# Patient Record
Sex: MALE | Race: WHITE | NOT HISPANIC OR LATINO | Employment: OTHER | URBAN - METROPOLITAN AREA
[De-identification: names, ages, dates, MRNs, and addresses within clinical notes are randomized per-mention and may not be internally consistent; named-entity substitution may affect disease eponyms.]

---

## 2017-02-22 ENCOUNTER — ALLSCRIPTS OFFICE VISIT (OUTPATIENT)
Dept: OTHER | Facility: OTHER | Age: 82
End: 2017-02-22

## 2017-07-07 ENCOUNTER — GENERIC CONVERSION - ENCOUNTER (OUTPATIENT)
Dept: OTHER | Facility: OTHER | Age: 82
End: 2017-07-07

## 2017-08-03 ENCOUNTER — ALLSCRIPTS OFFICE VISIT (OUTPATIENT)
Dept: OTHER | Facility: OTHER | Age: 82
End: 2017-08-03

## 2017-08-03 DIAGNOSIS — R07.9 CHEST PAIN: ICD-10-CM

## 2017-08-18 ENCOUNTER — HOSPITAL ENCOUNTER (OUTPATIENT)
Dept: NON INVASIVE DIAGNOSTICS | Facility: CLINIC | Age: 82
Discharge: HOME | End: 2017-08-18
Payer: MEDICARE

## 2017-08-18 DIAGNOSIS — R07.9 CHEST PAIN: ICD-10-CM

## 2017-08-18 PROCEDURE — A9502 TC99M TETROFOSMIN: HCPCS

## 2017-08-18 PROCEDURE — 93017 CV STRESS TEST TRACING ONLY: CPT

## 2017-08-18 PROCEDURE — 78452 HT MUSCLE IMAGE SPECT MULT: CPT

## 2017-08-18 PROCEDURE — 93306 TTE W/DOPPLER COMPLETE: CPT

## 2017-08-18 RX ADMIN — REGADENOSON 0.4 MG: 0.08 INJECTION, SOLUTION INTRAVENOUS at 14:15

## 2017-08-19 LAB
CHEST PAIN STATEMENT: NORMAL
MAX DIASTOLIC BP: 82 MMHG
MAX HEART RATE: 94 BPM
MAX PREDICTED HEART RATE: 136 BPM
MAX. SYSTOLIC BP: 182 MMHG
PROTOCOL NAME: NORMAL
REASON FOR TERMINATION: NORMAL
TARGET HR FORMULA: NORMAL
TEST INDICATION: NORMAL
TIME IN EXERCISE PHASE: 180 S

## 2017-09-01 ENCOUNTER — ALLSCRIPTS OFFICE VISIT (OUTPATIENT)
Dept: OTHER | Facility: OTHER | Age: 82
End: 2017-09-01

## 2017-09-01 ENCOUNTER — GENERIC CONVERSION - ENCOUNTER (OUTPATIENT)
Dept: OTHER | Facility: OTHER | Age: 82
End: 2017-09-01

## 2017-10-20 ENCOUNTER — GENERIC CONVERSION - ENCOUNTER (OUTPATIENT)
Dept: OTHER | Facility: OTHER | Age: 82
End: 2017-10-20

## 2017-11-09 ENCOUNTER — ALLSCRIPTS OFFICE VISIT (OUTPATIENT)
Dept: OTHER | Facility: OTHER | Age: 82
End: 2017-11-09

## 2017-11-11 NOTE — PROGRESS NOTES
Assessment  Assessed    1  Arteriosclerotic coronary artery disease (414 00) (I25 10)   2  Benign essential hypertension (401 1) (I10)   3  Acquired stenosis of aortic valve (424 1) (I35 0)   4  S/P aortic valve replacement with bioprosthetic valve (V42 2) (Z95 3)   5  Abnormal nuclear stress test (794 39) (R94 39)    Plan  Benign essential hypertension    · AmLODIPine Besylate 5 MG Oral Tablet (Norvasc); Take 1 tablet daily   Rx By: Lee Lambert; Dispense: 90 Days ; #:90 Tablet; Refill: 3;Benign essential hypertension; SAMEERA = N; Verified Transmission to [address]; Last Updated By: [employer]; 11/9/2017 4:52:06 PM   · Follow-up visit in 3 months Evaluation and Treatment  Follow-up  Status: Complete Done: [id number]   Ordered;Benign essential hypertension; Ordered By: Lee Lambert Performed:  Due: 43GQC1728; Last Updated By: eKisha Thurston; 11/9/2017 4:55:57 PM    Discussion/Summary  Cardiology Discussion Summary Free Text Note Form [facility]: We again discussed his CAD  He is not having any CP symptoms at this time  We will continue will medical management unless his symptoms change  I will add Norvasc 5 mg daily for better BP control  The rest of his medications remain the same  RTO 3 months  Chief Complaint  Chief Complaint Free Text Note Form: Hospital f/u after going to [facility]  He denies chest pain but said he gets SOB with exertion  [facility] prescribed medications that he wants to discuss with Dr Piyush Madrigal before taking  History of Present Illness  Cardiology HPI Free Text Note Form [facility]: He was recently in [facility] with a fever / viral URI   ECG showed changes  Troponin was WNL  Cardiac cath was recommended based on his ECG  He has known CAD with remote CABG  Recent nuclear stress test did show inferior ischemia  We discussed cardiac cath at his last OV and decided to follow him clinically on medical management  CP symptoms have resolved   He is not very active  He has not needed to use the Nitropatches  His weight is down from 226 to 217 LBS  BP has been up  Review of Systems  Cardiology Male ROS:    Cardiac: as noted in HPI  Skin: No complaints of nonhealing sores or skin rash  Genitourinary: No complaints of recurrent urinary tract infections, frequent urination at night, difficult urination, blood in urine, kidney stones, loss of bladder control, no kidney or prostate problems, no erectile dysfunction  Psychological: No complaints of feeling depressed, anxiety, panic attacks, or difficulty concentrating  General: No complaints of trouble sleeping, lack of energy, fatigue, appetite changes, weight changes, fever, frequent infections, or night sweats  Respiratory: No complaints of shortness of breath, cough with sputum, or wheezing  HEENT: No complaints of serious problems, hearing problems, nose problems, throat problems, or snoring  Gastrointestinal: No complaints of liver problems, nausea, vomiting, heartburn, constipation, bloody stools, diarrhea, problems swallowing, adbominal pain, or rectal bleeding  Hematologic: No complaints of bleeding disorders, anemia, blood clots, or excessive brusing  Neurological: No complaints of numbness, tingling, dizziness, weakness, seizures, headaches, syncope or fainting, AM fatigue, daytime sleepiness, no witnessed apnea episodes  Musculoskeletal: No complaints of arthritis, back pain, or painfull swelling  ROS Reviewed:   ROS reviewed  Active Problems  Problems    1  Acquired stenosis of aortic valve (424 1) (I35 0)   2  Arteriosclerotic coronary artery disease (414 00) (I25 10)   3  Atrial fibrillation (427 31) (I48 91)   4  Benign essential hypertension (401 1) (I10)   5  Chest pain, exertional (786 50) (R07 9)   6  Chronic venous insufficiency (459 81) (I87 2)   7  Ecchymosis (459 89) (R58)   8  Elevated TSH (794 5) (R94 6)   9   Encounter for monitoring amiodarone therapy (V58 83,V58 69) (Z51 81,Z79 899)   10  Exertional angina (413 9) (I20 8)   11  Fatigue (780 79) (R53 83)   12  Hypercholesterolemia (272 0) (E78 00)   13  Prediabetes (790 29) (R73 09)   14  Shortness of breath (786 05) (R06 02)   15  Sleep apnea (780 57) (G47 30)   16  Stroke Syndrome (436)   17  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)   18  Vitamin D deficiency (268 9) (E55 9)    Past Medical History  Problems    1  History of Congestive heart failure (428 0) (I50 9)  Active Problems And Past Medical History Reviewed: The active problems and past medical history were reviewed and updated today  Surgical History  Problems    1  History of Aortic Valve Replacement   2  History of CABG   3  History of Cath Placement Of Stent 1   4  History of Endarterectomy   5  History of Tonsillectomy  Surgical History Reviewed: The surgical history was reviewed and updated today  Family History  Father    1  Family history of    2  Family history of myocardial infarction (V17 3) (Z82 49)  Family History    3  Family history of Asthma with acute exacerbation, unspecified asthma severity   4  Family history of arthritis (V17 7) (Z82 61)  Family History Reviewed: The family history was reviewed and updated today  Social History  Problems    · Denied: History of Being A Social Drinker   · Denied: History of Caffeine use   · Former smoker (V15 82) (Q27 487)   · No drug use  Social History Reviewed: The social history was reviewed and updated today  The social history was reviewed and is unchanged  Current Meds   1  Amiodarone HCl - 200 MG Oral Tablet; TAKE 1 TABLET DAILY; Therapy: 20CSS6746 to (Evaluate:2017)  Requested for: 71SEB4549; Last Rx:00Zna7394 Ordered   2  Aspirin 81 MG TABS; TAKE 1 TABLET DAILY; Therapy: (Recorded:98Hxf3532) to Recorded   3  Crestor 10 MG Oral Tablet; TAKE 1 TABLET DAILY; Therapy: 30Jnd9495 to (Evaluate:72Uby0842) Recorded   4  Fish Oil CAPS; Take 2 tabs po qd;  Therapy: (Recorded:06Kwd6218) to Recorded   5  Furosemide 40 MG Oral Tablet; Take 1 tablet daily; Therapy: 08Apr2016 to  Requested for: 22Feb2017 Recorded   6  Metoprolol Succinate ER 25 MG Oral Tablet Extended Release 24 Hour; TAKE 1 TABLET ONCE DAILY; Therapy: 49CBW5888 to (Baljit Moore)  Requested for: 63ICW3413; Last Rx:06Mar2017 Ordered   7  Nitroglycerin 0 6 MG/HR Transdermal Patch 24 Hour; APPLY 1 PATCH Daily; Therapy: 01Sep2017 to (Evaluate:17Sny7600)  Requested for: 01Sep2017; Last Rx:01Sep2017 Ordered   8  Pantoprazole Sodium 40 MG Oral Tablet Delayed Release; take 1 tablet every day; Therapy: 07RIZ2630 to (Evaluate:19Mar2017)  Requested for: 26KKR6499; Last Rx:24Mar2016 Ordered   9  ROPINIRole HCl - 0 25 MG Oral Tablet; Take 1 tablet daily; Therapy: (Recorded:82Hse0317) to Recorded   10  Vitamin D3 5000 UNIT Oral Capsule; take 1 capsule daily; Therapy: (Recorded:16Jun2016) to Recorded  Medication List Reviewed: The medication list was reviewed and updated today  Allergies  Medication    1  Penicillins    Vitals  Vital Signs    Recorded: 20OAD4817 04:39PM   Heart Rate 72, Apical   Systolic 293, RUE   Diastolic 70, RUE   Height 5 ft 10 in   Weight 217 lb    BMI Calculated 31 14   BSA Calculated 2 16       Physical Exam   Constitutional  General appearance: No acute distress, well appearing and well nourished  Eyes  Conjunctiva and Sclera examination: Conjunctiva pink, sclera anicteric  Ears, Nose, Mouth, and Throat - Oropharynx: Clear, nares are clear, mucous membranes are moist   Neck  Neck and thyroid: Normal, supple, trachea midline, no thyromegaly  Pulmonary  Respiratory effort: No increased work of breathing or signs of respiratory distress  Auscultation of lungs: Clear to auscultation, no rales, no rhonchi, no wheezing, good air movement  Cardiovascular  Auscultation of heart: Normal rate and rhythm, normal S1 and S2, no murmurs  Carotid pulses: Normal, 2+ bilaterally     Peripheral vascular exam: Normal pulses throughout, no tenderness, erythema or swelling  Pedal pulses: Normal, 2+ bilaterally  Examination of extremities for edema and/or varicosities: Normal    Abdomen  Abdomen: Non-tender and no distention  Liver and spleen: No hepatomegaly or splenomegaly  Musculoskeletal Gait and station: Normal gait  -- Digits and nails: Normal without clubbing or cyanosis  -- Inspection/palpation of joints, bones, and muscles: Normal, ROM normal    Skin - Skin and subcutaneous tissue: Normal without rashes or lesions  Skin is warm and well perfused, normal turgor  Neurologic - Cranial nerves: II - XII intact  -- Speech: Normal    Psychiatric - Orientation to person, place, and time: Normal -- Mood and affect: Normal       Signatures   Electronically signed by : ARTURO Davidson ; Nov 10 2017  7:49AM EST                       (Author)

## 2018-01-13 VITALS
HEIGHT: 70 IN | SYSTOLIC BLOOD PRESSURE: 146 MMHG | DIASTOLIC BLOOD PRESSURE: 58 MMHG | WEIGHT: 225.44 LBS | BODY MASS INDEX: 32.27 KG/M2 | HEART RATE: 54 BPM

## 2018-01-13 VITALS
BODY MASS INDEX: 32.21 KG/M2 | HEIGHT: 70 IN | WEIGHT: 225 LBS | DIASTOLIC BLOOD PRESSURE: 66 MMHG | HEART RATE: 66 BPM | SYSTOLIC BLOOD PRESSURE: 110 MMHG

## 2018-01-15 VITALS
BODY MASS INDEX: 31.07 KG/M2 | HEIGHT: 70 IN | WEIGHT: 217 LBS | SYSTOLIC BLOOD PRESSURE: 160 MMHG | HEART RATE: 72 BPM | DIASTOLIC BLOOD PRESSURE: 70 MMHG

## 2018-01-22 VITALS
WEIGHT: 226.06 LBS | HEIGHT: 70 IN | BODY MASS INDEX: 32.36 KG/M2 | HEART RATE: 61 BPM | DIASTOLIC BLOOD PRESSURE: 68 MMHG | SYSTOLIC BLOOD PRESSURE: 148 MMHG

## 2018-02-14 RX ORDER — FUROSEMIDE 40 MG/1
1 TABLET ORAL DAILY
COMMUNITY
Start: 2016-04-08 | End: 2018-08-13 | Stop reason: SDUPTHER

## 2018-02-14 RX ORDER — ROPINIROLE 0.25 MG/1
1 TABLET, FILM COATED ORAL DAILY
COMMUNITY
End: 2018-02-15 | Stop reason: SDUPTHER

## 2018-02-14 RX ORDER — AMLODIPINE BESYLATE 5 MG/1
1 TABLET ORAL DAILY
COMMUNITY
Start: 2017-11-09 | End: 2018-11-17 | Stop reason: SDUPTHER

## 2018-02-14 RX ORDER — ROSUVASTATIN CALCIUM 10 MG/1
1 TABLET, COATED ORAL DAILY
COMMUNITY
Start: 2017-08-03 | End: 2018-02-15 | Stop reason: SDUPTHER

## 2018-02-14 RX ORDER — MAG HYDROX/ALUMINUM HYD/SIMETH 400-400-40
1 SUSPENSION, ORAL (FINAL DOSE FORM) ORAL DAILY
COMMUNITY

## 2018-02-14 RX ORDER — NITROGLYCERIN 120 MG/1
1 PATCH TRANSDERMAL DAILY
COMMUNITY
Start: 2017-09-01 | End: 2019-01-01 | Stop reason: SDUPTHER

## 2018-02-14 RX ORDER — METOPROLOL SUCCINATE 25 MG/1
1 TABLET, EXTENDED RELEASE ORAL DAILY
COMMUNITY
Start: 2014-10-29 | End: 2018-02-15 | Stop reason: SDUPTHER

## 2018-02-14 RX ORDER — AMIODARONE HYDROCHLORIDE 200 MG/1
1 TABLET ORAL DAILY
COMMUNITY
Start: 2015-06-19 | End: 2019-01-01 | Stop reason: SDUPTHER

## 2018-02-14 RX ORDER — PANTOPRAZOLE SODIUM 40 MG/1
1 TABLET, DELAYED RELEASE ORAL DAILY
COMMUNITY
Start: 2011-09-24 | End: 2018-02-15 | Stop reason: ALTCHOICE

## 2018-02-15 ENCOUNTER — OFFICE VISIT (OUTPATIENT)
Dept: CARDIOLOGY CLINIC | Facility: CLINIC | Age: 83
End: 2018-02-15
Payer: MEDICARE

## 2018-02-15 VITALS
HEIGHT: 70 IN | BODY MASS INDEX: 31.5 KG/M2 | HEART RATE: 60 BPM | SYSTOLIC BLOOD PRESSURE: 156 MMHG | DIASTOLIC BLOOD PRESSURE: 62 MMHG | WEIGHT: 220 LBS

## 2018-02-15 DIAGNOSIS — I25.10 CAD, MULTIPLE VESSEL: ICD-10-CM

## 2018-02-15 DIAGNOSIS — I35.0 NONRHEUMATIC AORTIC VALVE STENOSIS: Primary | ICD-10-CM

## 2018-02-15 DIAGNOSIS — I10 ESSENTIAL HYPERTENSION: ICD-10-CM

## 2018-02-15 DIAGNOSIS — R94.39 ABNORMAL NUCLEAR STRESS TEST: ICD-10-CM

## 2018-02-15 DIAGNOSIS — Z95.2 S/P AVR: ICD-10-CM

## 2018-02-15 DIAGNOSIS — Z95.1 HX OF CABG: ICD-10-CM

## 2018-02-15 PROCEDURE — 99214 OFFICE O/P EST MOD 30 MIN: CPT | Performed by: INTERNAL MEDICINE

## 2018-02-15 RX ORDER — BRIMONIDINE TARTRATE 0.15 %
1 DROPS OPHTHALMIC (EYE) 2 TIMES DAILY
COMMUNITY
Start: 2015-03-20

## 2018-02-15 RX ORDER — FUROSEMIDE 20 MG/1
TABLET ORAL
COMMUNITY
Start: 2017-11-17 | End: 2018-02-15 | Stop reason: SDUPTHER

## 2018-02-15 RX ORDER — GABAPENTIN 300 MG/1
300 CAPSULE ORAL
COMMUNITY
Start: 2016-07-19

## 2018-02-15 RX ORDER — ROPINIROLE 0.25 MG/1
0.25 TABLET, FILM COATED ORAL
COMMUNITY
Start: 2017-02-21

## 2018-02-15 RX ORDER — AMLODIPINE BESYLATE 5 MG/1
TABLET ORAL
COMMUNITY
Start: 2017-11-09 | End: 2018-09-19 | Stop reason: SDUPTHER

## 2018-02-15 RX ORDER — METOPROLOL SUCCINATE 25 MG/1
25 TABLET, EXTENDED RELEASE ORAL
COMMUNITY
Start: 2017-06-26 | End: 2018-03-29 | Stop reason: SDUPTHER

## 2018-02-15 RX ORDER — AMIODARONE HYDROCHLORIDE 200 MG/1
200 TABLET ORAL
COMMUNITY
Start: 2012-10-10 | End: 2018-09-19 | Stop reason: SDUPTHER

## 2018-02-15 RX ORDER — OMEPRAZOLE 20 MG/1
CAPSULE, DELAYED RELEASE ORAL
COMMUNITY
Start: 2018-02-08 | End: 2019-01-01

## 2018-02-15 RX ORDER — IBUPROFEN 200 MG
1 CAPSULE ORAL
COMMUNITY

## 2018-02-15 RX ORDER — ROSUVASTATIN CALCIUM 10 MG/1
10 TABLET, COATED ORAL
COMMUNITY
Start: 2017-11-03

## 2018-02-16 NOTE — PROGRESS NOTES
Cardiology Follow Up    Aida Haynes  1933  [id number]  [address] [facility]  [address] [place] [address] [address]    1  Nonrheumatic aortic valve stenosis     2  CAD, multiple vessel     3  Essential hypertension     4  Abnormal nuclear stress test     5  Hx of CABG     6  S/P AVR           Discussion/Summary: All of his assessed cardiac problems appear to be stable  I have reviewed his medications and made no changes  No cardiac testing is ordered  RTO 6 months  Interval History:  He is not very active but has had only occasional CP since his last OV  He denies significant SOB  He denies palpitations, dizziness  He has CAD with prior CABG and AS with prior bioprosthetic AVR  Last stress test did show ischemia but he has remain stable with medical therapy  Patient Active Problem List   Diagnosis    Nonrheumatic aortic valve stenosis    CAD, multiple vessel    Essential hypertension    Abnormal nuclear stress test    Hx of CABG    S/P AVR     History reviewed  No pertinent past medical history  Social History     Social History    Marital status:      Spouse name: N/A    Number of children: N/A    Years of education: N/A     Occupational History    Not on file  Social History Main Topics    Smoking status: Former Smoker    Smokeless tobacco: Never Used    Alcohol use No    Drug use: No    Sexual activity: Not on file     Other Topics Concern    Not on file     Social History Narrative    No narrative on file      History reviewed  No pertinent family history  History reviewed  No pertinent surgical history      Current Outpatient Prescriptions:     amiodarone 200 mg tablet, Take 1 tablet by mouth daily, Disp: , Rfl:     amiodarone 200 mg tablet, Take 200 mg by mouth, Disp: , Rfl:     amLODIPine (NORVASC) 5 mg tablet, Take 1 tablet by mouth daily, Disp: , Rfl:     amLODIPine (NORVASC) 5 mg tablet, , Disp: , Rfl:     aspirin 81 MG tablet, Take 1 tablet by mouth daily, Disp: , Rfl:     aspirin 81 MG tablet, Take 81 mg by mouth, Disp: , Rfl:     brimonidine (ALPHAGAN P) 0 15 % ophthalmic solution, Apply 1 drop to eye, Disp: , Rfl:     calcium carbonate (OS-JESUS) 1250 (500 Ca) MG tablet, Take 1 tablet by mouth, Disp: , Rfl:     Cholecalciferol (VITAMIN D3) 5000 units CAPS, Take 1 capsule by mouth daily, Disp: , Rfl:     ERGOCALCIFEROL PO, Take 50,000 Units by mouth, Disp: , Rfl:     furosemide (LASIX) 40 mg tablet, Take 1 tablet by mouth daily, Disp: , Rfl:     gabapentin (NEURONTIN) 300 mg capsule, Take 300 mg by mouth, Disp: , Rfl:     metoprolol succinate (TOPROL-XL) 25 mg 24 hr tablet, Take 25 mg by mouth, Disp: , Rfl:     nitroglycerin (NITRODUR) 0 6 mg/hr, Place 1 patch on the skin daily, Disp: , Rfl:     Omega-3 Fatty Acids (FISH OIL) 645 MG CAPS, Take 2 tablets by mouth daily, Disp: , Rfl:     omeprazole (PriLOSEC) 20 mg delayed release capsule, , Disp: , Rfl:     rOPINIRole (REQUIP) 0 25 mg tablet, Take 0 25 mg by mouth 2 (two) times a day, Disp: , Rfl:     rosuvastatin (CRESTOR) 10 MG tablet, Take 10 mg by mouth, Disp: , Rfl:   Allergies not on file    Labs:  No visits with results within 2 Month(s) from this visit  Latest known visit with results is:   Hospital Outpatient Visit on 08/18/2017   Component Date Value    Protocol Name 08/18/2017 [facility]            Time In Exercise Phase 08/18/2017 180     MAX  SYSTOLIC BP 81/45/3087 394     Max Diastolic Bp 00/27/1095 82     Max Heart Rate 08/18/2017 94     Max Predicted Heart Rate 08/18/2017 136     Reason for Termination 08/18/2017 Test Complete     Test Indication 08/18/2017 Screening for CAD     Target Hr Formular 08/18/2017 (220 - Age)*100%     Chest Pain Statement 08/18/2017 none      Imaging: No results found      Review of Systems:  Review of Systems   Constitutional: Negative for diaphoresis, fatigue, fever and unexpected weight change  HENT: Negative  Respiratory: Negative for cough, shortness of breath and wheezing  Cardiovascular: Positive for chest pain  Negative for palpitations and leg swelling  Gastrointestinal: Negative for abdominal pain, diarrhea and nausea  Musculoskeletal: Negative for gait problem and myalgias  Skin: Negative for rash  Neurological: Negative for dizziness and numbness  Psychiatric/Behavioral: Negative  Physical Exam:  Physical Exam   Constitutional: He is oriented to person, place, and time  He appears well-developed and well-nourished  HENT:   Head: Normocephalic and atraumatic  Eyes: Pupils are equal, round, and reactive to light  Neck: Normal range of motion  Neck supple  No JVD present  Cardiovascular: Regular rhythm, S1 normal, S2 normal and normal pulses  Murmur heard  Systolic murmur is present with a grade of 2/6   Pulses:       Carotid pulses are 2+ on the right side, and 2+ on the left side  Pulmonary/Chest: Effort normal and breath sounds normal  He has no wheezes  He has no rales  Abdominal: Soft  Bowel sounds are normal  There is no tenderness  Musculoskeletal: Normal range of motion  He exhibits no edema or tenderness  Neurological: He is alert and oriented to person, place, and time  He has normal reflexes  No cranial nerve deficit  Skin: Skin is warm  Psychiatric: He has a normal mood and affect

## 2018-03-29 DIAGNOSIS — I25.10 CORONARY ARTERY DISEASE INVOLVING NATIVE CORONARY ARTERY OF NATIVE HEART WITHOUT ANGINA PECTORIS: Primary | ICD-10-CM

## 2018-03-30 RX ORDER — METOPROLOL SUCCINATE 25 MG/1
TABLET, EXTENDED RELEASE ORAL
Qty: 30 TABLET | Refills: 11 | Status: SHIPPED | OUTPATIENT
Start: 2018-03-30 | End: 2018-04-03 | Stop reason: SDUPTHER

## 2018-04-03 DIAGNOSIS — I25.10 CORONARY ARTERY DISEASE INVOLVING NATIVE CORONARY ARTERY OF NATIVE HEART WITHOUT ANGINA PECTORIS: ICD-10-CM

## 2018-04-03 RX ORDER — METOPROLOL SUCCINATE 25 MG/1
TABLET, EXTENDED RELEASE ORAL
Qty: 30 TABLET | Refills: 11 | Status: SHIPPED | OUTPATIENT
Start: 2018-04-03 | End: 2019-01-01 | Stop reason: SDUPTHER

## 2018-08-13 DIAGNOSIS — R60.0 BILATERAL LEG EDEMA: Primary | ICD-10-CM

## 2018-08-13 RX ORDER — FUROSEMIDE 40 MG/1
TABLET ORAL
Qty: 90 TABLET | Refills: 3 | Status: SHIPPED | OUTPATIENT
Start: 2018-08-13 | End: 2019-01-01 | Stop reason: SDUPTHER

## 2018-09-19 ENCOUNTER — OFFICE VISIT (OUTPATIENT)
Dept: CARDIOLOGY CLINIC | Facility: CLINIC | Age: 83
End: 2018-09-19
Payer: MEDICARE

## 2018-09-19 VITALS
HEIGHT: 70 IN | DIASTOLIC BLOOD PRESSURE: 72 MMHG | BODY MASS INDEX: 31.7 KG/M2 | HEART RATE: 61 BPM | WEIGHT: 221.4 LBS | SYSTOLIC BLOOD PRESSURE: 138 MMHG

## 2018-09-19 DIAGNOSIS — I10 ESSENTIAL HYPERTENSION: Primary | ICD-10-CM

## 2018-09-19 DIAGNOSIS — Z95.2 S/P AVR: ICD-10-CM

## 2018-09-19 DIAGNOSIS — Z95.1 HX OF CABG: ICD-10-CM

## 2018-09-19 DIAGNOSIS — I35.0 NONRHEUMATIC AORTIC VALVE STENOSIS: ICD-10-CM

## 2018-09-19 DIAGNOSIS — I25.10 CAD, MULTIPLE VESSEL: ICD-10-CM

## 2018-09-19 PROCEDURE — 99214 OFFICE O/P EST MOD 30 MIN: CPT | Performed by: INTERNAL MEDICINE

## 2018-09-19 PROCEDURE — 93000 ELECTROCARDIOGRAM COMPLETE: CPT | Performed by: INTERNAL MEDICINE

## 2018-09-19 RX ORDER — DOCUSATE SODIUM 100 MG/1
100 CAPSULE, LIQUID FILLED ORAL
COMMUNITY
End: 2019-01-01

## 2018-09-19 RX ORDER — OMEPRAZOLE 20 MG/1
20 CAPSULE, DELAYED RELEASE ORAL
COMMUNITY
Start: 2018-05-21 | End: 2018-09-19 | Stop reason: SDUPTHER

## 2018-09-19 RX ORDER — MV-MIN/FA/VIT K/LUTEIN/ZEAXANT 200MCG-5MG
CAPSULE ORAL
COMMUNITY

## 2018-09-19 RX ORDER — DORZOLAMIDE HCL 20 MG/ML
1 SOLUTION/ DROPS OPHTHALMIC 2 TIMES DAILY
COMMUNITY

## 2018-09-19 NOTE — PROGRESS NOTES
Cardiology Follow Up    Wilian Cook  1933  [id number]  [address] [facility] [address]ers[address] [facility] [address] [phone]    1  Essential hypertension  POCT ECG   2  CAD, multiple vessel     3  Nonrheumatic aortic valve stenosis     4  Hx of CABG     5  S/P AVR           Discussion/Summary: He recommended that he stop his ASA and restart Pradaxa  We discussed the risk of bleeding with the Pradaxa  I told him that I feel that the risk of a stroke is higher than the risk of bleeding with taking Pradaxa  He understands but is not agreeable to switch  No cardiac testing is ordered  RTO 6 months  Interval History: He has not had any cardiac problems since his last OV  He remains fairly inactive  He denies significant SOB, CP  He denies dizziness  BP is stable  He has CAD and AS with prior CABG / AVR  He has a history of PAF but had a significant spontaneous R arm bleed several years ago when he was taking Pradaxa  He has remained in SR  ECG today shows Atrial Flutter with 4:1 conduction  HR is 61 BPM     Patient Active Problem List   Diagnosis    Nonrheumatic aortic valve stenosis    CAD, multiple vessel    Essential hypertension    Abnormal nuclear stress test    Hx of CABG    S/P AVR     No past medical history on file  Social History     Social History    Marital status:      Spouse name: N/A    Number of children: N/A    Years of education: N/A     Occupational History    Not on file  Social History Main Topics    Smoking status: Former Smoker    Smokeless tobacco: Never Used    Alcohol use No    Drug use: No    Sexual activity: Not on file     Other Topics Concern    Not on file     Social History Narrative    No narrative on file      No family history on file  No past surgical history on file      Current Outpatient Prescriptions:     amiodarone 200 mg tablet, Take 1 tablet by mouth daily, Disp: , Rfl:     amLODIPine (NORVASC) 5 mg tablet, Take 1 tablet by mouth daily, Disp: , Rfl:     aspirin 81 MG tablet, Take 1 tablet by mouth daily, Disp: , Rfl:     brimonidine (ALPHAGAN P) 0 15 % ophthalmic solution, Apply 1 drop to eye, Disp: , Rfl:     calcium carbonate (OS-JESUS) 1250 (500 Ca) MG tablet, Take 1 tablet by mouth, Disp: , Rfl:     Cholecalciferol (VITAMIN D3) 5000 units CAPS, Take 1 capsule by mouth daily, Disp: , Rfl:     docusate sodium (COLACE) 100 mg capsule, Take 100 mg by mouth, Disp: , Rfl:     dorzolamide (TRUSOPT) 2 % ophthalmic solution, Apply 1 drop to eye, Disp: , Rfl:     furosemide (LASIX) 40 mg tablet, TAKE ONE TABLET BY MOUTH ONCE DAILY, Disp: 90 tablet, Rfl: 3    gabapentin (NEURONTIN) 300 mg capsule, Take 300 mg by mouth, Disp: , Rfl:     metoprolol succinate (TOPROL-XL) 25 mg 24 hr tablet, TAKE ONE TABLET BY MOUTH ONCE DAILY, Disp: 30 tablet, Rfl: 11    Multiple Vitamins-Minerals (PRESERVISION AREDS 2+MULTI VIT) CAPS, Take by mouth, Disp: , Rfl:     nitroglycerin (NITRODUR) 0 6 mg/hr, Place 1 patch on the skin daily, Disp: , Rfl:     Omega-3 Fatty Acids (FISH OIL) 645 MG CAPS, Take 2 tablets by mouth daily, Disp: , Rfl:     omeprazole (PriLOSEC) 20 mg delayed release capsule, , Disp: , Rfl:     rOPINIRole (REQUIP) 0 25 mg tablet, Take 0 25 mg by mouth 2 (two) times a day, Disp: , Rfl:     rosuvastatin (CRESTOR) 10 MG tablet, Take 10 mg by mouth, Disp: , Rfl:     ERGOCALCIFEROL PO, Take 50,000 Units by mouth, Disp: , Rfl:   Allergies   Allergen Reactions    Penicillins Rash     rash     Vitals:    09/19/18 1516   BP: 138/72   BP Location: Left arm   Patient Position: Sitting   Cuff Size: Standard   Pulse: 61   Weight: 100 kg (221 lb 6 4 oz)   Height: 5' 10" (1 778 m)     Weight (last 2 days)     Date/Time   Weight    09/19/18 1516  100 (221 4)             Blood pressure 138/72, pulse 61, height 5' 10" (1 778 m), weight 100 kg (221 lb 6 4 oz)  , Body mass index is 31 77 kg/m²  Labs:  No visits with results within 6 Month(s) from this visit  Latest known visit with results is:   Hospital Outpatient Visit on 08/18/2017   Component Date Value    Protocol Name 08/18/2017 [facility]            Time In Exercise Phase 08/18/2017 180     MAX  SYSTOLIC BP 46/74/3459 240     Max Diastolic Bp 95/24/9834 82     Max Heart Rate 08/18/2017 94     Max Predicted Heart Rate 08/18/2017 136     Reason for Termination 08/18/2017 Test Complete     Test Indication 08/18/2017 Screening for CAD     Target Hr Formular 08/18/2017 (220 - Age)*100%     Chest Pain Statement 08/18/2017 none      Imaging: No results found  Review of Systems:  Review of Systems   Constitutional: Negative for diaphoresis, fatigue, fever and unexpected weight change  HENT: Negative  Respiratory: Negative for cough, shortness of breath and wheezing  Cardiovascular: Negative for chest pain, palpitations and leg swelling  Gastrointestinal: Negative for abdominal pain, diarrhea and nausea  Musculoskeletal: Negative for gait problem and myalgias  Skin: Negative for rash  Neurological: Negative for dizziness and numbness  Psychiatric/Behavioral: Negative  Physical Exam:  Physical Exam   Constitutional: He is oriented to person, place, and time  He appears well-developed and well-nourished  HENT:   Head: Normocephalic and atraumatic  Eyes: Pupils are equal, round, and reactive to light  Neck: Normal range of motion  Neck supple  No JVD present  Cardiovascular: Regular rhythm, S1 normal, S2 normal and normal pulses  Pulses:       Carotid pulses are 2+ on the right side, and 2+ on the left side  Pulmonary/Chest: Effort normal and breath sounds normal  He has no wheezes  He has no rales  Abdominal: Soft  Bowel sounds are normal  There is no tenderness  Musculoskeletal: Normal range of motion  He exhibits no edema or tenderness     Neurological: He is alert and oriented to person, place, and time  He has normal reflexes  No cranial nerve deficit  Skin: Skin is warm  Psychiatric: He has a normal mood and affect

## 2018-11-17 DIAGNOSIS — I10 ESSENTIAL HYPERTENSION: Primary | ICD-10-CM

## 2018-11-17 RX ORDER — AMLODIPINE BESYLATE 5 MG/1
TABLET ORAL
Qty: 90 TABLET | Refills: 3 | Status: SHIPPED | OUTPATIENT
Start: 2018-11-17 | End: 2019-01-01 | Stop reason: SDUPTHER

## 2019-01-01 ENCOUNTER — APPOINTMENT (INPATIENT)
Dept: RADIOLOGY | Facility: HOSPITAL | Age: 84
DRG: 023 | End: 2019-01-01
Payer: MEDICARE

## 2019-01-01 ENCOUNTER — OFFICE VISIT (OUTPATIENT)
Dept: PODIATRY | Facility: CLINIC | Age: 84
End: 2019-01-01
Payer: MEDICARE

## 2019-01-01 ENCOUNTER — ANESTHESIA EVENT (EMERGENCY)
Dept: RADIOLOGY | Facility: HOSPITAL | Age: 84
DRG: 023 | End: 2019-01-01
Payer: MEDICARE

## 2019-01-01 ENCOUNTER — APPOINTMENT (EMERGENCY)
Dept: RADIOLOGY | Facility: HOSPITAL | Age: 84
DRG: 023 | End: 2019-01-01
Payer: MEDICARE

## 2019-01-01 ENCOUNTER — APPOINTMENT (INPATIENT)
Dept: GASTROENTEROLOGY | Facility: HOSPITAL | Age: 84
DRG: 023 | End: 2019-01-01
Payer: MEDICARE

## 2019-01-01 ENCOUNTER — OFFICE VISIT (OUTPATIENT)
Dept: CARDIOLOGY CLINIC | Facility: CLINIC | Age: 84
End: 2019-01-01
Payer: MEDICARE

## 2019-01-01 ENCOUNTER — HOSPITAL ENCOUNTER (OUTPATIENT)
Dept: NON INVASIVE DIAGNOSTICS | Facility: CLINIC | Age: 84
Discharge: HOME | End: 2019-04-04
Payer: MEDICARE

## 2019-01-01 ENCOUNTER — HOSPITAL ENCOUNTER (INPATIENT)
Facility: HOSPITAL | Age: 84
LOS: 2 days | DRG: 023 | End: 2019-12-02
Attending: EMERGENCY MEDICINE | Admitting: ANESTHESIOLOGY
Payer: MEDICARE

## 2019-01-01 ENCOUNTER — APPOINTMENT (EMERGENCY)
Dept: RADIOLOGY | Facility: HOSPITAL | Age: 84
DRG: 023 | End: 2019-01-01
Attending: NEUROLOGICAL SURGERY
Payer: MEDICARE

## 2019-01-01 ENCOUNTER — ANESTHESIA (EMERGENCY)
Dept: RADIOLOGY | Facility: HOSPITAL | Age: 84
DRG: 023 | End: 2019-01-01
Payer: MEDICARE

## 2019-01-01 ENCOUNTER — APPOINTMENT (INPATIENT)
Dept: NON INVASIVE DIAGNOSTICS | Facility: HOSPITAL | Age: 84
DRG: 023 | End: 2019-01-01
Payer: MEDICARE

## 2019-01-01 VITALS
WEIGHT: 209.44 LBS | DIASTOLIC BLOOD PRESSURE: 71 MMHG | TEMPERATURE: 101.1 F | RESPIRATION RATE: 15 BRPM | HEART RATE: 92 BPM | BODY MASS INDEX: 29.98 KG/M2 | OXYGEN SATURATION: 100 % | SYSTOLIC BLOOD PRESSURE: 160 MMHG | HEIGHT: 70 IN

## 2019-01-01 VITALS
HEART RATE: 87 BPM | HEIGHT: 70 IN | DIASTOLIC BLOOD PRESSURE: 72 MMHG | BODY MASS INDEX: 30.21 KG/M2 | SYSTOLIC BLOOD PRESSURE: 113 MMHG | WEIGHT: 211 LBS

## 2019-01-01 VITALS
HEIGHT: 70 IN | SYSTOLIC BLOOD PRESSURE: 126 MMHG | HEART RATE: 76 BPM | BODY MASS INDEX: 31.64 KG/M2 | DIASTOLIC BLOOD PRESSURE: 60 MMHG | WEIGHT: 221 LBS

## 2019-01-01 VITALS
BODY MASS INDEX: 30.49 KG/M2 | HEART RATE: 68 BPM | HEIGHT: 70 IN | WEIGHT: 213 LBS | SYSTOLIC BLOOD PRESSURE: 136 MMHG | DIASTOLIC BLOOD PRESSURE: 60 MMHG

## 2019-01-01 VITALS
HEART RATE: 64 BPM | HEIGHT: 70 IN | WEIGHT: 218 LBS | DIASTOLIC BLOOD PRESSURE: 63 MMHG | SYSTOLIC BLOOD PRESSURE: 131 MMHG | BODY MASS INDEX: 31.21 KG/M2

## 2019-01-01 DIAGNOSIS — R07.9 CHEST PAIN, UNSPECIFIED TYPE: ICD-10-CM

## 2019-01-01 DIAGNOSIS — I48.0 PAROXYSMAL ATRIAL FIBRILLATION (HCC): Primary | ICD-10-CM

## 2019-01-01 DIAGNOSIS — I73.9 PERIPHERAL VASCULAR DISEASE, UNSPECIFIED (HCC): Primary | ICD-10-CM

## 2019-01-01 DIAGNOSIS — I25.118 CORONARY ARTERY DISEASE OF NATIVE ARTERY OF NATIVE HEART WITH STABLE ANGINA PECTORIS (HCC): Primary | ICD-10-CM

## 2019-01-01 DIAGNOSIS — G62.9 PERIPHERAL NERVE DISORDER: ICD-10-CM

## 2019-01-01 DIAGNOSIS — R77.8 TROPONIN LEVEL ELEVATED: ICD-10-CM

## 2019-01-01 DIAGNOSIS — I10 ESSENTIAL HYPERTENSION: ICD-10-CM

## 2019-01-01 DIAGNOSIS — B35.1 ONYCHOMYCOSIS: ICD-10-CM

## 2019-01-01 DIAGNOSIS — I48.20 CHRONIC ATRIAL FIBRILLATION (HCC): ICD-10-CM

## 2019-01-01 DIAGNOSIS — I35.0 NONRHEUMATIC AORTIC VALVE STENOSIS: ICD-10-CM

## 2019-01-01 DIAGNOSIS — I25.118 CORONARY ARTERY DISEASE OF NATIVE ARTERY OF NATIVE HEART WITH STABLE ANGINA PECTORIS (HCC): ICD-10-CM

## 2019-01-01 DIAGNOSIS — I63.9 CVA (CEREBRAL VASCULAR ACCIDENT) (HCC): Primary | ICD-10-CM

## 2019-01-01 DIAGNOSIS — J96.01 ACUTE RESPIRATORY FAILURE WITH HYPOXIA (HCC): ICD-10-CM

## 2019-01-01 DIAGNOSIS — Z95.2 S/P AVR: ICD-10-CM

## 2019-01-01 DIAGNOSIS — I10 ESSENTIAL HYPERTENSION: Primary | ICD-10-CM

## 2019-01-01 DIAGNOSIS — I25.10 CORONARY ARTERY DISEASE INVOLVING NATIVE CORONARY ARTERY OF NATIVE HEART WITHOUT ANGINA PECTORIS: ICD-10-CM

## 2019-01-01 DIAGNOSIS — Z95.1 HX OF CABG: ICD-10-CM

## 2019-01-01 DIAGNOSIS — I48.0 PAROXYSMAL ATRIAL FIBRILLATION (HCC): ICD-10-CM

## 2019-01-01 DIAGNOSIS — R94.39 ABNORMAL NUCLEAR STRESS TEST: ICD-10-CM

## 2019-01-01 DIAGNOSIS — I63.9 ISCHEMIC STROKE (HCC): ICD-10-CM

## 2019-01-01 DIAGNOSIS — R07.9 CHEST PAIN, UNSPECIFIED TYPE: Primary | ICD-10-CM

## 2019-01-01 DIAGNOSIS — R60.0 BILATERAL LEG EDEMA: ICD-10-CM

## 2019-01-01 LAB
ALBUMIN SERPL BCP-MCNC: 3.1 G/DL (ref 3.5–5)
ALP SERPL-CCNC: 81 U/L (ref 46–116)
ALT SERPL W P-5'-P-CCNC: 22 U/L (ref 12–78)
ANION GAP BLD CALC-SCNC: 16 MMOL/L (ref 4–13)
ANION GAP SERPL CALCULATED.3IONS-SCNC: 10 MMOL/L (ref 4–13)
ANION GAP SERPL CALCULATED.3IONS-SCNC: 6 MMOL/L (ref 4–13)
ANION GAP SERPL CALCULATED.3IONS-SCNC: 6 MMOL/L (ref 4–13)
ANION GAP SERPL CALCULATED.3IONS-SCNC: 7 MMOL/L (ref 4–13)
ANION GAP SERPL CALCULATED.3IONS-SCNC: 8 MMOL/L (ref 4–13)
APTT PPP: 29 SECONDS (ref 23–37)
ARTERIAL PATENCY WRIST A: YES
AST SERPL W P-5'-P-CCNC: 71 U/L (ref 5–45)
ATRIAL RATE: 122 BPM
ATRIAL RATE: 178 BPM
ATRIAL RATE: 99 BPM
BASE EX.OXY STD BLDV CALC-SCNC: 67.1 % (ref 60–80)
BASE EXCESS BLDA CALC-SCNC: -12.1 MMOL/L
BASE EXCESS BLDA CALC-SCNC: -7 MMOL/L
BASE EXCESS BLDA CALC-SCNC: -7.8 MMOL/L
BASE EXCESS BLDA CALC-SCNC: -8 MMOL/L
BASE EXCESS BLDA CALC-SCNC: -8.6 MMOL/L
BASE EXCESS BLDA CALC-SCNC: 0 MMOL/L (ref -2–3)
BASE EXCESS BLDV CALC-SCNC: -9 MMOL/L
BASOPHILS # BLD AUTO: 0.02 THOUSANDS/ΜL (ref 0–0.1)
BASOPHILS # BLD AUTO: 0.03 THOUSANDS/ΜL (ref 0–0.1)
BASOPHILS # BLD AUTO: 0.07 THOUSANDS/ΜL (ref 0–0.1)
BASOPHILS NFR BLD AUTO: 0 % (ref 0–1)
BILIRUB SERPL-MCNC: 0.78 MG/DL (ref 0.2–1)
BODY TEMPERATURE: 100.6 DEGREES FEHRENHEIT
BODY TEMPERATURE: 100.8 DEGREES FEHRENHEIT
BODY TEMPERATURE: 101 DEGREES FEHRENHEIT
BODY TEMPERATURE: 98 DEGREES FEHRENHEIT
BODY TEMPERATURE: 99 DEGREES FEHRENHEIT
BUN BLD-MCNC: 25 MG/DL (ref 5–25)
BUN SERPL-MCNC: 23 MG/DL (ref 5–25)
BUN SERPL-MCNC: 24 MG/DL (ref 5–25)
BUN SERPL-MCNC: 24 MG/DL (ref 5–25)
BUN SERPL-MCNC: 36 MG/DL (ref 5–25)
BUN SERPL-MCNC: 38 MG/DL (ref 5–25)
CA-I BLD-SCNC: 1.14 MMOL/L (ref 1.12–1.32)
CA-I BLD-SCNC: 1.15 MMOL/L (ref 1.12–1.32)
CALCIUM SERPL-MCNC: 7.6 MG/DL (ref 8.3–10.1)
CALCIUM SERPL-MCNC: 7.8 MG/DL (ref 8.3–10.1)
CALCIUM SERPL-MCNC: 8.1 MG/DL (ref 8.3–10.1)
CALCIUM SERPL-MCNC: 8.2 MG/DL (ref 8.3–10.1)
CALCIUM SERPL-MCNC: 9.2 MG/DL (ref 8.3–10.1)
CHLORIDE BLD-SCNC: 103 MMOL/L (ref 100–108)
CHLORIDE SERPL-SCNC: 105 MMOL/L (ref 100–108)
CHLORIDE SERPL-SCNC: 106 MMOL/L (ref 100–108)
CHLORIDE SERPL-SCNC: 112 MMOL/L (ref 100–108)
CHLORIDE SERPL-SCNC: 113 MMOL/L (ref 100–108)
CHLORIDE SERPL-SCNC: 115 MMOL/L (ref 100–108)
CHOLEST SERPL-MCNC: 198 MG/DL (ref 50–200)
CO2 SERPL-SCNC: 19 MMOL/L (ref 21–32)
CO2 SERPL-SCNC: 20 MMOL/L (ref 21–32)
CO2 SERPL-SCNC: 21 MMOL/L (ref 21–32)
CO2 SERPL-SCNC: 22 MMOL/L (ref 21–32)
CO2 SERPL-SCNC: 27 MMOL/L (ref 21–32)
CREAT BLD-MCNC: 1.3 MG/DL (ref 0.6–1.3)
CREAT SERPL-MCNC: 1.27 MG/DL (ref 0.6–1.3)
CREAT SERPL-MCNC: 1.3 MG/DL (ref 0.6–1.3)
CREAT SERPL-MCNC: 1.4 MG/DL (ref 0.6–1.3)
CREAT SERPL-MCNC: 1.54 MG/DL (ref 0.6–1.3)
CREAT SERPL-MCNC: 1.63 MG/DL (ref 0.6–1.3)
EOSINOPHIL # BLD AUTO: 0 THOUSAND/ΜL (ref 0–0.61)
EOSINOPHIL # BLD AUTO: 0 THOUSAND/ΜL (ref 0–0.61)
EOSINOPHIL # BLD AUTO: 0.02 THOUSAND/ΜL (ref 0–0.61)
EOSINOPHIL NFR BLD AUTO: 0 % (ref 0–6)
ERYTHROCYTE [DISTWIDTH] IN BLOOD BY AUTOMATED COUNT: 14 % (ref 11.6–15.1)
ERYTHROCYTE [DISTWIDTH] IN BLOOD BY AUTOMATED COUNT: 14 % (ref 11.6–15.1)
ERYTHROCYTE [DISTWIDTH] IN BLOOD BY AUTOMATED COUNT: 14.4 % (ref 11.6–15.1)
ERYTHROCYTE [DISTWIDTH] IN BLOOD BY AUTOMATED COUNT: 14.9 % (ref 11.6–15.1)
EST. AVERAGE GLUCOSE BLD GHB EST-MCNC: 114 MG/DL
GFR SERPL CREATININE-BSD FRML MDRD: 38 ML/MIN/1.73SQ M
GFR SERPL CREATININE-BSD FRML MDRD: 40 ML/MIN/1.73SQ M
GFR SERPL CREATININE-BSD FRML MDRD: 45 ML/MIN/1.73SQ M
GFR SERPL CREATININE-BSD FRML MDRD: 49 ML/MIN/1.73SQ M
GFR SERPL CREATININE-BSD FRML MDRD: 49 ML/MIN/1.73SQ M
GFR SERPL CREATININE-BSD FRML MDRD: 51 ML/MIN/1.73SQ M
GLUCOSE SERPL-MCNC: 107 MG/DL (ref 65–140)
GLUCOSE SERPL-MCNC: 109 MG/DL (ref 65–140)
GLUCOSE SERPL-MCNC: 109 MG/DL (ref 65–140)
GLUCOSE SERPL-MCNC: 133 MG/DL (ref 65–140)
GLUCOSE SERPL-MCNC: 137 MG/DL (ref 65–140)
GLUCOSE SERPL-MCNC: 139 MG/DL (ref 65–140)
GLUCOSE SERPL-MCNC: 160 MG/DL (ref 65–140)
GLUCOSE SERPL-MCNC: 162 MG/DL (ref 65–140)
GLUCOSE SERPL-MCNC: 167 MG/DL (ref 65–140)
GLUCOSE SERPL-MCNC: 168 MG/DL (ref 65–140)
GLUCOSE SERPL-MCNC: 171 MG/DL (ref 65–140)
GLUCOSE SERPL-MCNC: 189 MG/DL (ref 65–140)
GLUCOSE SERPL-MCNC: 218 MG/DL (ref 65–140)
HBA1C MFR BLD: 5.6 % (ref 4.2–6.3)
HCO3 BLDA-SCNC: 16 MMOL/L (ref 22–28)
HCO3 BLDA-SCNC: 16.1 MMOL/L (ref 22–28)
HCO3 BLDA-SCNC: 16.4 MMOL/L (ref 22–28)
HCO3 BLDA-SCNC: 17.5 MMOL/L (ref 22–28)
HCO3 BLDA-SCNC: 23.5 MMOL/L (ref 22–28)
HCO3 BLDA-SCNC: 25.7 MMOL/L (ref 24–30)
HCO3 BLDV-SCNC: 17.7 MMOL/L (ref 24–30)
HCT VFR BLD AUTO: 35.4 % (ref 36.5–49.3)
HCT VFR BLD AUTO: 37.8 % (ref 36.5–49.3)
HCT VFR BLD AUTO: 42.6 % (ref 36.5–49.3)
HCT VFR BLD AUTO: 43.1 % (ref 36.5–49.3)
HCT VFR BLD AUTO: 48 % (ref 36.5–49.3)
HCT VFR BLD CALC: 43 % (ref 36.5–49.3)
HCT VFR BLD CALC: 44 % (ref 36.5–49.3)
HDLC SERPL-MCNC: 49 MG/DL
HGB BLD-MCNC: 11.5 G/DL (ref 12–17)
HGB BLD-MCNC: 12.6 G/DL (ref 12–17)
HGB BLD-MCNC: 14.3 G/DL (ref 12–17)
HGB BLD-MCNC: 14.5 G/DL (ref 12–17)
HGB BLD-MCNC: 15.9 G/DL (ref 12–17)
HGB BLDA-MCNC: 14.6 G/DL (ref 12–17)
HGB BLDA-MCNC: 15 G/DL (ref 12–17)
HOROWITZ INDEX BLDA+IHG-RTO: 100 MM[HG]
IMM GRANULOCYTES # BLD AUTO: 0.07 THOUSAND/UL (ref 0–0.2)
IMM GRANULOCYTES # BLD AUTO: 0.08 THOUSAND/UL (ref 0–0.2)
IMM GRANULOCYTES # BLD AUTO: 0.11 THOUSAND/UL (ref 0–0.2)
IMM GRANULOCYTES NFR BLD AUTO: 0 % (ref 0–2)
IMM GRANULOCYTES NFR BLD AUTO: 1 % (ref 0–2)
IMM GRANULOCYTES NFR BLD AUTO: 1 % (ref 0–2)
INR PPP: 1.02 (ref 0.84–1.19)
LACTATE SERPL-SCNC: 2.8 MMOL/L (ref 0.5–2)
LACTATE SERPL-SCNC: 3 MMOL/L (ref 0.5–2)
LACTATE SERPL-SCNC: 3.4 MMOL/L (ref 0.5–2)
LACTATE SERPL-SCNC: 4.1 MMOL/L (ref 0.5–2)
LACTATE SERPL-SCNC: 4.2 MMOL/L (ref 0.5–2)
LDLC SERPL CALC-MCNC: 114 MG/DL (ref 0–100)
LYMPHOCYTES # BLD AUTO: 0.72 THOUSANDS/ΜL (ref 0.6–4.47)
LYMPHOCYTES # BLD AUTO: 1.05 THOUSANDS/ΜL (ref 0.6–4.47)
LYMPHOCYTES # BLD AUTO: 1.35 THOUSANDS/ΜL (ref 0.6–4.47)
LYMPHOCYTES NFR BLD AUTO: 5 % (ref 14–44)
LYMPHOCYTES NFR BLD AUTO: 5 % (ref 14–44)
LYMPHOCYTES NFR BLD AUTO: 6 % (ref 14–44)
MAGNESIUM SERPL-MCNC: 2.1 MG/DL (ref 1.6–2.6)
MAGNESIUM SERPL-MCNC: 2.6 MG/DL (ref 1.6–2.6)
MCH RBC QN AUTO: 31.7 PG (ref 26.8–34.3)
MCH RBC QN AUTO: 31.9 PG (ref 26.8–34.3)
MCHC RBC AUTO-ENTMCNC: 32.5 G/DL (ref 31.4–37.4)
MCHC RBC AUTO-ENTMCNC: 33.1 G/DL (ref 31.4–37.4)
MCHC RBC AUTO-ENTMCNC: 33.3 G/DL (ref 31.4–37.4)
MCHC RBC AUTO-ENTMCNC: 33.6 G/DL (ref 31.4–37.4)
MCV RBC AUTO: 95 FL (ref 82–98)
MCV RBC AUTO: 95 FL (ref 82–98)
MCV RBC AUTO: 96 FL (ref 82–98)
MCV RBC AUTO: 98 FL (ref 82–98)
MONOCYTES # BLD AUTO: 0.84 THOUSAND/ΜL (ref 0.17–1.22)
MONOCYTES # BLD AUTO: 1.26 THOUSAND/ΜL (ref 0.17–1.22)
MONOCYTES # BLD AUTO: 1.7 THOUSAND/ΜL (ref 0.17–1.22)
MONOCYTES NFR BLD AUTO: 6 % (ref 4–12)
MONOCYTES NFR BLD AUTO: 6 % (ref 4–12)
MONOCYTES NFR BLD AUTO: 8 % (ref 4–12)
NEUTROPHILS # BLD AUTO: 12.92 THOUSANDS/ΜL (ref 1.85–7.62)
NEUTROPHILS # BLD AUTO: 18.23 THOUSANDS/ΜL (ref 1.85–7.62)
NEUTROPHILS # BLD AUTO: 18.9 THOUSANDS/ΜL (ref 1.85–7.62)
NEUTS SEG NFR BLD AUTO: 85 % (ref 43–75)
NEUTS SEG NFR BLD AUTO: 88 % (ref 43–75)
NEUTS SEG NFR BLD AUTO: 89 % (ref 43–75)
NRBC BLD AUTO-RTO: 0 /100 WBCS
O2 CT BLDA-SCNC: 19.2 ML/DL (ref 16–23)
O2 CT BLDA-SCNC: 19.3 ML/DL (ref 16–23)
O2 CT BLDA-SCNC: 19.5 ML/DL (ref 16–23)
O2 CT BLDA-SCNC: 20.2 ML/DL (ref 16–23)
O2 CT BLDA-SCNC: 20.5 ML/DL (ref 16–23)
O2 CT BLDV-SCNC: 13.7 ML/DL
OXYHGB MFR BLDA: 80.9 % (ref 94–97)
OXYHGB MFR BLDA: 85.1 % (ref 94–97)
OXYHGB MFR BLDA: 92.2 % (ref 94–97)
OXYHGB MFR BLDA: 95.5 % (ref 94–97)
OXYHGB MFR BLDA: 98.7 % (ref 94–97)
PCO2 BLD: 25 MMOL/L (ref 21–32)
PCO2 BLD: 27 MMOL/L (ref 21–32)
PCO2 BLD: 43.4 MM HG (ref 42–50)
PCO2 BLDA: 26.1 MM HG (ref 36–44)
PCO2 BLDA: 31.4 MM HG (ref 36–44)
PCO2 BLDA: 35.6 MM HG (ref 36–44)
PCO2 BLDA: 46.6 MM HG (ref 36–44)
PCO2 BLDA: 75.1 MM HG (ref 36–44)
PCO2 BLDV: 41.1 MM HG (ref 42–50)
PCO2 TEMP ADJ BLDA: 33.2 MM HG (ref 36–44)
PEEP RESPIRATORY: 10 CM[H2O]
PEEP RESPIRATORY: 14 CM[H2O]
PEEP RESPIRATORY: 15 CM[H2O]
PEEP RESPIRATORY: 15 CM[H2O]
PH BLD: 7.31 [PH] (ref 7.35–7.45)
PH BLD: 7.38 [PH] (ref 7.3–7.4)
PH BLDA: 7.11 [PH] (ref 7.35–7.45)
PH BLDA: 7.17 [PH] (ref 7.35–7.45)
PH BLDA: 7.31 [PH] (ref 7.35–7.45)
PH BLDA: 7.33 [PH] (ref 7.35–7.45)
PH BLDA: 7.41 [PH] (ref 7.35–7.45)
PH BLDV: 7.25 [PH] (ref 7.3–7.4)
PHOSPHATE SERPL-MCNC: 1.9 MG/DL (ref 2.3–4.1)
PHOSPHATE SERPL-MCNC: 4.3 MG/DL (ref 2.3–4.1)
PLATELET # BLD AUTO: 160 THOUSANDS/UL (ref 149–390)
PLATELET # BLD AUTO: 232 THOUSANDS/UL (ref 149–390)
PLATELET # BLD AUTO: 252 THOUSANDS/UL (ref 149–390)
PLATELET # BLD AUTO: 297 THOUSANDS/UL (ref 149–390)
PMV BLD AUTO: 10 FL (ref 8.9–12.7)
PMV BLD AUTO: 9.4 FL (ref 8.9–12.7)
PMV BLD AUTO: 9.5 FL (ref 8.9–12.7)
PMV BLD AUTO: 9.7 FL (ref 8.9–12.7)
PO2 BLD: 28 MM HG (ref 35–45)
PO2 BLD: 93.4 MM HG (ref 75–129)
PO2 BLDA: 178.3 MM HG (ref 75–129)
PO2 BLDA: 57.4 MM HG (ref 75–129)
PO2 BLDA: 59.6 MM HG (ref 75–129)
PO2 BLDA: 70.2 MM HG (ref 75–129)
PO2 BLDA: 85.9 MM HG (ref 75–129)
PO2 BLDV: 37.3 MM HG (ref 35–45)
POTASSIUM BLD-SCNC: 4.4 MMOL/L (ref 3.5–5.3)
POTASSIUM BLD-SCNC: 4.4 MMOL/L (ref 3.5–5.3)
POTASSIUM SERPL-SCNC: 4 MMOL/L (ref 3.5–5.3)
POTASSIUM SERPL-SCNC: 4.5 MMOL/L (ref 3.5–5.3)
POTASSIUM SERPL-SCNC: 4.6 MMOL/L (ref 3.5–5.3)
POTASSIUM SERPL-SCNC: 4.6 MMOL/L (ref 3.5–5.3)
POTASSIUM SERPL-SCNC: 5 MMOL/L (ref 3.5–5.3)
PROCALCITONIN SERPL-MCNC: 1.55 NG/ML
PROCALCITONIN SERPL-MCNC: 1.91 NG/ML
PROT SERPL-MCNC: 6.7 G/DL (ref 6.4–8.2)
PROTHROMBIN TIME: 13 SECONDS (ref 11.6–14.5)
QRS AXIS: 109 DEGREES
QRS AXIS: 89 DEGREES
QRS AXIS: 89 DEGREES
QRSD INTERVAL: 104 MS
QRSD INTERVAL: 104 MS
QRSD INTERVAL: 106 MS
QT INTERVAL: 300 MS
QT INTERVAL: 312 MS
QT INTERVAL: 346 MS
QTC INTERVAL: 418 MS
QTC INTERVAL: 428 MS
QTC INTERVAL: 444 MS
RBC # BLD AUTO: 3.61 MILLION/UL (ref 3.88–5.62)
RBC # BLD AUTO: 3.98 MILLION/UL (ref 3.88–5.62)
RBC # BLD AUTO: 4.51 MILLION/UL (ref 3.88–5.62)
RBC # BLD AUTO: 5.01 MILLION/UL (ref 3.88–5.62)
SAO2 % BLD FROM PO2: 52 % (ref 60–85)
SODIUM BLD-SCNC: 138 MMOL/L (ref 136–145)
SODIUM BLD-SCNC: 139 MMOL/L (ref 136–145)
SODIUM SERPL-SCNC: 136 MMOL/L (ref 136–145)
SODIUM SERPL-SCNC: 138 MMOL/L (ref 136–145)
SODIUM SERPL-SCNC: 139 MMOL/L (ref 136–145)
SODIUM SERPL-SCNC: 142 MMOL/L (ref 136–145)
SODIUM SERPL-SCNC: 142 MMOL/L (ref 136–145)
SPECIMEN SOURCE: ABNORMAL
T WAVE AXIS: -76 DEGREES
T WAVE AXIS: 261 DEGREES
T WAVE AXIS: 269 DEGREES
TRIGL SERPL-MCNC: 174 MG/DL
TROPONIN I SERPL-MCNC: 11.1 NG/ML
TROPONIN I SERPL-MCNC: 6.5 NG/ML
TROPONIN I SERPL-MCNC: 6.84 NG/ML
TROPONIN I SERPL-MCNC: 9.5 NG/ML
TROPONIN I SERPL-MCNC: <0.02 NG/ML
VENT AC: 14
VENT AC: 20
VENT AC: 22
VENT- AC: AC
VENTRICULAR RATE: 108 BPM
VENTRICULAR RATE: 122 BPM
VENTRICULAR RATE: 99 BPM
VT SETTING VENT: 500 ML
WBC # BLD AUTO: 11.31 THOUSAND/UL (ref 4.31–10.16)
WBC # BLD AUTO: 14.57 THOUSAND/UL (ref 4.31–10.16)
WBC # BLD AUTO: 20.71 THOUSAND/UL (ref 4.31–10.16)
WBC # BLD AUTO: 22.09 THOUSAND/UL (ref 4.31–10.16)

## 2019-01-01 PROCEDURE — 83735 ASSAY OF MAGNESIUM: CPT | Performed by: EMERGENCY MEDICINE

## 2019-01-01 PROCEDURE — 80047 BASIC METABLC PNL IONIZED CA: CPT

## 2019-01-01 PROCEDURE — 99233 SBSQ HOSP IP/OBS HIGH 50: CPT | Performed by: PSYCHIATRY & NEUROLOGY

## 2019-01-01 PROCEDURE — 99291 CRITICAL CARE FIRST HOUR: CPT | Performed by: ANESTHESIOLOGY

## 2019-01-01 PROCEDURE — 36556 INSERT NON-TUNNEL CV CATH: CPT | Performed by: ANESTHESIOLOGY

## 2019-01-01 PROCEDURE — 96361 HYDRATE IV INFUSION ADD-ON: CPT

## 2019-01-01 PROCEDURE — 83605 ASSAY OF LACTIC ACID: CPT | Performed by: EMERGENCY MEDICINE

## 2019-01-01 PROCEDURE — 85025 COMPLETE CBC W/AUTO DIFF WBC: CPT | Performed by: INTERNAL MEDICINE

## 2019-01-01 PROCEDURE — 93005 ELECTROCARDIOGRAM TRACING: CPT

## 2019-01-01 PROCEDURE — 71045 X-RAY EXAM CHEST 1 VIEW: CPT

## 2019-01-01 PROCEDURE — 93306 TTE W/DOPPLER COMPLETE: CPT | Performed by: INTERNAL MEDICINE

## 2019-01-01 PROCEDURE — 96360 HYDRATION IV INFUSION INIT: CPT

## 2019-01-01 PROCEDURE — 70496 CT ANGIOGRAPHY HEAD: CPT

## 2019-01-01 PROCEDURE — B316ZZZ FLUOROSCOPY OF RIGHT INTERNAL CAROTID ARTERY: ICD-10-PCS | Performed by: EMERGENCY MEDICINE

## 2019-01-01 PROCEDURE — 99223 1ST HOSP IP/OBS HIGH 75: CPT | Performed by: PSYCHIATRY & NEUROLOGY

## 2019-01-01 PROCEDURE — B40JYZZ PLAIN RADIOGRAPHY OF OTHER LOWER ARTERIES USING OTHER CONTRAST: ICD-10-PCS | Performed by: EMERGENCY MEDICINE

## 2019-01-01 PROCEDURE — 80053 COMPREHEN METABOLIC PANEL: CPT | Performed by: INTERNAL MEDICINE

## 2019-01-01 PROCEDURE — 85018 HEMOGLOBIN: CPT | Performed by: INTERNAL MEDICINE

## 2019-01-01 PROCEDURE — 85025 COMPLETE CBC W/AUTO DIFF WBC: CPT | Performed by: EMERGENCY MEDICINE

## 2019-01-01 PROCEDURE — 11721 DEBRIDE NAIL 6 OR MORE: CPT | Performed by: PODIATRIST

## 2019-01-01 PROCEDURE — NC001 PR NO CHARGE: Performed by: EMERGENCY MEDICINE

## 2019-01-01 PROCEDURE — 5A1945Z RESPIRATORY VENTILATION, 24-96 CONSECUTIVE HOURS: ICD-10-PCS | Performed by: EMERGENCY MEDICINE

## 2019-01-01 PROCEDURE — 3E03317 INTRODUCTION OF OTHER THROMBOLYTIC INTO PERIPHERAL VEIN, PERCUTANEOUS APPROACH: ICD-10-PCS | Performed by: NEUROLOGICAL SURGERY

## 2019-01-01 PROCEDURE — C1760 CLOSURE DEV, VASC: HCPCS

## 2019-01-01 PROCEDURE — 71250 CT THORAX DX C-: CPT

## 2019-01-01 PROCEDURE — 93306 TTE W/DOPPLER COMPLETE: CPT

## 2019-01-01 PROCEDURE — 99233 SBSQ HOSP IP/OBS HIGH 50: CPT | Performed by: NEUROLOGICAL SURGERY

## 2019-01-01 PROCEDURE — 83605 ASSAY OF LACTIC ACID: CPT | Performed by: ANESTHESIOLOGY

## 2019-01-01 PROCEDURE — C9113 INJ PANTOPRAZOLE SODIUM, VIA: HCPCS | Performed by: ANESTHESIOLOGY

## 2019-01-01 PROCEDURE — 85610 PROTHROMBIN TIME: CPT | Performed by: EMERGENCY MEDICINE

## 2019-01-01 PROCEDURE — 93000 ELECTROCARDIOGRAM COMPLETE: CPT | Performed by: INTERNAL MEDICINE

## 2019-01-01 PROCEDURE — 94640 AIRWAY INHALATION TREATMENT: CPT

## 2019-01-01 PROCEDURE — 84100 ASSAY OF PHOSPHORUS: CPT | Performed by: EMERGENCY MEDICINE

## 2019-01-01 PROCEDURE — 05HY33Z INSERTION OF INFUSION DEVICE INTO UPPER VEIN, PERCUTANEOUS APPROACH: ICD-10-PCS | Performed by: EMERGENCY MEDICINE

## 2019-01-01 PROCEDURE — 82805 BLOOD GASES W/O2 SATURATION: CPT | Performed by: INTERNAL MEDICINE

## 2019-01-01 PROCEDURE — 82805 BLOOD GASES W/O2 SATURATION: CPT | Performed by: EMERGENCY MEDICINE

## 2019-01-01 PROCEDURE — 99232 SBSQ HOSP IP/OBS MODERATE 35: CPT | Performed by: PHYSICIAN ASSISTANT

## 2019-01-01 PROCEDURE — 83735 ASSAY OF MAGNESIUM: CPT | Performed by: FAMILY MEDICINE

## 2019-01-01 PROCEDURE — 87186 SC STD MICRODIL/AGAR DIL: CPT | Performed by: ANESTHESIOLOGY

## 2019-01-01 PROCEDURE — 82330 ASSAY OF CALCIUM: CPT

## 2019-01-01 PROCEDURE — 99238 HOSP IP/OBS DSCHRG MGMT 30/<: CPT | Performed by: EMERGENCY MEDICINE

## 2019-01-01 PROCEDURE — 94003 VENT MGMT INPAT SUBQ DAY: CPT

## 2019-01-01 PROCEDURE — 99222 1ST HOSP IP/OBS MODERATE 55: CPT | Performed by: INTERNAL MEDICINE

## 2019-01-01 PROCEDURE — 99291 CRITICAL CARE FIRST HOUR: CPT

## 2019-01-01 PROCEDURE — NC001 PR NO CHARGE: Performed by: ANESTHESIOLOGY

## 2019-01-01 PROCEDURE — 0BH17EZ INSERTION OF ENDOTRACHEAL AIRWAY INTO TRACHEA, VIA NATURAL OR ARTIFICIAL OPENING: ICD-10-PCS | Performed by: EMERGENCY MEDICINE

## 2019-01-01 PROCEDURE — 84295 ASSAY OF SERUM SODIUM: CPT

## 2019-01-01 PROCEDURE — 80048 BASIC METABOLIC PNL TOTAL CA: CPT | Performed by: FAMILY MEDICINE

## 2019-01-01 PROCEDURE — 84484 ASSAY OF TROPONIN QUANT: CPT | Performed by: EMERGENCY MEDICINE

## 2019-01-01 PROCEDURE — 99283 EMERGENCY DEPT VISIT LOW MDM: CPT | Performed by: EMERGENCY MEDICINE

## 2019-01-01 PROCEDURE — ND001 PR NO DOCUMENTATION: Performed by: ANESTHESIOLOGY

## 2019-01-01 PROCEDURE — 80048 BASIC METABOLIC PNL TOTAL CA: CPT | Performed by: EMERGENCY MEDICINE

## 2019-01-01 PROCEDURE — 99232 SBSQ HOSP IP/OBS MODERATE 35: CPT | Performed by: NEUROLOGICAL SURGERY

## 2019-01-01 PROCEDURE — 94760 N-INVAS EAR/PLS OXIMETRY 1: CPT

## 2019-01-01 PROCEDURE — NC001 PR NO CHARGE: Performed by: NEUROLOGICAL SURGERY

## 2019-01-01 PROCEDURE — 70450 CT HEAD/BRAIN W/O DYE: CPT

## 2019-01-01 PROCEDURE — 87070 CULTURE OTHR SPECIMN AEROBIC: CPT | Performed by: ANESTHESIOLOGY

## 2019-01-01 PROCEDURE — 70498 CT ANGIOGRAPHY NECK: CPT

## 2019-01-01 PROCEDURE — 80061 LIPID PANEL: CPT | Performed by: EMERGENCY MEDICINE

## 2019-01-01 PROCEDURE — 93010 ELECTROCARDIOGRAM REPORT: CPT | Performed by: INTERNAL MEDICINE

## 2019-01-01 PROCEDURE — 85027 COMPLETE CBC AUTOMATED: CPT | Performed by: EMERGENCY MEDICINE

## 2019-01-01 PROCEDURE — 99024 POSTOP FOLLOW-UP VISIT: CPT | Performed by: NEUROLOGICAL SURGERY

## 2019-01-01 PROCEDURE — 85025 COMPLETE CBC W/AUTO DIFF WBC: CPT | Performed by: FAMILY MEDICINE

## 2019-01-01 PROCEDURE — 83036 HEMOGLOBIN GLYCOSYLATED A1C: CPT | Performed by: EMERGENCY MEDICINE

## 2019-01-01 PROCEDURE — 36415 COLL VENOUS BLD VENIPUNCTURE: CPT

## 2019-01-01 PROCEDURE — 94002 VENT MGMT INPAT INIT DAY: CPT

## 2019-01-01 PROCEDURE — 84484 ASSAY OF TROPONIN QUANT: CPT | Performed by: INTERNAL MEDICINE

## 2019-01-01 PROCEDURE — 87077 CULTURE AEROBIC IDENTIFY: CPT | Performed by: ANESTHESIOLOGY

## 2019-01-01 PROCEDURE — 84132 ASSAY OF SERUM POTASSIUM: CPT

## 2019-01-01 PROCEDURE — 61645 PERQ ART M-THROMBECT &/NFS: CPT | Performed by: NEUROLOGICAL SURGERY

## 2019-01-01 PROCEDURE — 03CK3Z7 EXTIRPATION OF MATTER FROM RIGHT INTERNAL CAROTID ARTERY USING STENT RETRIEVER, PERCUTANEOUS APPROACH: ICD-10-PCS | Performed by: NEUROLOGICAL SURGERY

## 2019-01-01 PROCEDURE — 99214 OFFICE O/P EST MOD 30 MIN: CPT | Performed by: INTERNAL MEDICINE

## 2019-01-01 PROCEDURE — 84145 PROCALCITONIN (PCT): CPT | Performed by: ANESTHESIOLOGY

## 2019-01-01 PROCEDURE — 85014 HEMATOCRIT: CPT | Performed by: INTERNAL MEDICINE

## 2019-01-01 PROCEDURE — 85730 THROMBOPLASTIN TIME PARTIAL: CPT | Performed by: EMERGENCY MEDICINE

## 2019-01-01 PROCEDURE — 82947 ASSAY GLUCOSE BLOOD QUANT: CPT

## 2019-01-01 PROCEDURE — 82948 REAGENT STRIP/BLOOD GLUCOSE: CPT

## 2019-01-01 PROCEDURE — 87040 BLOOD CULTURE FOR BACTERIA: CPT | Performed by: INTERNAL MEDICINE

## 2019-01-01 PROCEDURE — 84145 PROCALCITONIN (PCT): CPT | Performed by: INTERNAL MEDICINE

## 2019-01-01 PROCEDURE — 80048 BASIC METABOLIC PNL TOTAL CA: CPT | Performed by: INTERNAL MEDICINE

## 2019-01-01 PROCEDURE — 84100 ASSAY OF PHOSPHORUS: CPT | Performed by: FAMILY MEDICINE

## 2019-01-01 PROCEDURE — 82803 BLOOD GASES ANY COMBINATION: CPT

## 2019-01-01 PROCEDURE — 85014 HEMATOCRIT: CPT

## 2019-01-01 PROCEDURE — 82805 BLOOD GASES W/O2 SATURATION: CPT | Performed by: ANESTHESIOLOGY

## 2019-01-01 RX ORDER — FENTANYL CITRATE 50 UG/ML
100 INJECTION, SOLUTION INTRAMUSCULAR; INTRAVENOUS ONCE
Status: COMPLETED | OUTPATIENT
Start: 2019-01-01 | End: 2019-01-01

## 2019-01-01 RX ORDER — GLYCOPYRROLATE 0.2 MG/ML
0.2 INJECTION INTRAMUSCULAR; INTRAVENOUS
Status: DISCONTINUED | OUTPATIENT
Start: 2019-01-01 | End: 2019-01-01 | Stop reason: HOSPADM

## 2019-01-01 RX ORDER — LIDOCAINE HYDROCHLORIDE 10 MG/ML
INJECTION, SOLUTION EPIDURAL; INFILTRATION; INTRACAUDAL; PERINEURAL
Status: COMPLETED
Start: 2019-01-01 | End: 2019-01-01

## 2019-01-01 RX ORDER — ROCURONIUM BROMIDE 10 MG/ML
INJECTION, SOLUTION INTRAVENOUS AS NEEDED
Status: DISCONTINUED | OUTPATIENT
Start: 2019-01-01 | End: 2019-01-01 | Stop reason: SURG

## 2019-01-01 RX ORDER — HYDROMORPHONE HCL 110MG/55ML
2 PATIENT CONTROLLED ANALGESIA SYRINGE INTRAVENOUS
Status: DISCONTINUED | OUTPATIENT
Start: 2019-01-01 | End: 2019-01-01 | Stop reason: HOSPADM

## 2019-01-01 RX ORDER — FENTANYL CITRATE 50 UG/ML
INJECTION, SOLUTION INTRAMUSCULAR; INTRAVENOUS
Status: COMPLETED
Start: 2019-01-01 | End: 2019-01-01

## 2019-01-01 RX ORDER — PROPOFOL 10 MG/ML
INJECTION, EMULSION INTRAVENOUS AS NEEDED
Status: DISCONTINUED | OUTPATIENT
Start: 2019-01-01 | End: 2019-01-01 | Stop reason: SURG

## 2019-01-01 RX ORDER — FENTANYL CITRATE-0.9 % NACL/PF 10 MCG/ML
25 PLASTIC BAG, INJECTION (ML) INTRAVENOUS CONTINUOUS
Status: DISCONTINUED | OUTPATIENT
Start: 2019-01-01 | End: 2019-01-01

## 2019-01-01 RX ORDER — AMIODARONE HYDROCHLORIDE 200 MG/1
200 TABLET ORAL DAILY
Qty: 90 TABLET | Refills: 3 | Status: SHIPPED | OUTPATIENT
Start: 2019-01-01 | End: 2019-01-01 | Stop reason: ALTCHOICE

## 2019-01-01 RX ORDER — SODIUM CHLORIDE 9 MG/ML
100 INJECTION, SOLUTION INTRAVENOUS ONCE
Status: COMPLETED | OUTPATIENT
Start: 2019-01-01 | End: 2019-01-01

## 2019-01-01 RX ORDER — HYDRALAZINE HYDROCHLORIDE 20 MG/ML
10 INJECTION INTRAMUSCULAR; INTRAVENOUS EVERY 6 HOURS PRN
Status: DISCONTINUED | OUTPATIENT
Start: 2019-01-01 | End: 2019-01-01

## 2019-01-01 RX ORDER — ALBUMIN, HUMAN INJ 5% 5 %
12.5 SOLUTION INTRAVENOUS ONCE
Status: COMPLETED | OUTPATIENT
Start: 2019-01-01 | End: 2019-01-01

## 2019-01-01 RX ORDER — METOPROLOL SUCCINATE 25 MG/1
TABLET, EXTENDED RELEASE ORAL
Qty: 90 TABLET | Refills: 3 | Status: SHIPPED | OUTPATIENT
Start: 2019-01-01

## 2019-01-01 RX ORDER — LIDOCAINE HYDROCHLORIDE 10 MG/ML
30 INJECTION, SOLUTION INFILTRATION; PERINEURAL ONCE
Status: COMPLETED | OUTPATIENT
Start: 2019-01-01 | End: 2019-01-01

## 2019-01-01 RX ORDER — MIDAZOLAM HYDROCHLORIDE 2 MG/2ML
2 INJECTION, SOLUTION INTRAMUSCULAR; INTRAVENOUS
Status: DISCONTINUED | OUTPATIENT
Start: 2019-01-01 | End: 2019-01-01 | Stop reason: HOSPADM

## 2019-01-01 RX ORDER — LABETALOL 20 MG/4 ML (5 MG/ML) INTRAVENOUS SYRINGE
10 EVERY 6 HOURS PRN
Status: DISCONTINUED | OUTPATIENT
Start: 2019-01-01 | End: 2019-01-01

## 2019-01-01 RX ORDER — MIDAZOLAM HYDROCHLORIDE 2 MG/2ML
INJECTION, SOLUTION INTRAMUSCULAR; INTRAVENOUS
Status: COMPLETED
Start: 2019-01-01 | End: 2019-01-01

## 2019-01-01 RX ORDER — NITROGLYCERIN 120 MG/1
1 PATCH TRANSDERMAL DAILY
Qty: 25 PATCH | Refills: 3 | Status: SHIPPED | OUTPATIENT
Start: 2019-01-01 | End: 2019-01-01

## 2019-01-01 RX ORDER — SODIUM CHLORIDE 9 MG/ML
75 INJECTION, SOLUTION INTRAVENOUS CONTINUOUS
Status: DISCONTINUED | OUTPATIENT
Start: 2019-01-01 | End: 2019-01-01

## 2019-01-01 RX ORDER — ATORVASTATIN CALCIUM 40 MG/1
40 TABLET, FILM COATED ORAL EVERY EVENING
Status: DISCONTINUED | OUTPATIENT
Start: 2019-01-01 | End: 2019-01-01

## 2019-01-01 RX ORDER — GLYCOPYRROLATE 0.2 MG/ML
0.2 INJECTION INTRAMUSCULAR; INTRAVENOUS ONCE
Status: COMPLETED | OUTPATIENT
Start: 2019-01-01 | End: 2019-01-01

## 2019-01-01 RX ORDER — ACETAMINOPHEN 160 MG/5ML
650 SUSPENSION, ORAL (FINAL DOSE FORM) ORAL EVERY 6 HOURS PRN
Status: DISCONTINUED | OUTPATIENT
Start: 2019-01-01 | End: 2019-01-01

## 2019-01-01 RX ORDER — PANTOPRAZOLE SODIUM 40 MG/1
40 INJECTION, POWDER, FOR SOLUTION INTRAVENOUS
Status: DISCONTINUED | OUTPATIENT
Start: 2019-01-01 | End: 2019-01-01

## 2019-01-01 RX ORDER — HYDROMORPHONE HCL/PF 1 MG/ML
1 SYRINGE (ML) INJECTION
Status: DISCONTINUED | OUTPATIENT
Start: 2019-01-01 | End: 2019-01-01

## 2019-01-01 RX ORDER — LIDOCAINE HYDROCHLORIDE 10 MG/ML
INJECTION, SOLUTION INFILTRATION; PERINEURAL AS NEEDED
Status: DISCONTINUED | OUTPATIENT
Start: 2019-01-01 | End: 2019-01-01 | Stop reason: SURG

## 2019-01-01 RX ORDER — CHLORHEXIDINE GLUCONATE 0.12 MG/ML
15 RINSE ORAL EVERY 12 HOURS SCHEDULED
Status: DISCONTINUED | OUTPATIENT
Start: 2019-01-01 | End: 2019-01-01

## 2019-01-01 RX ORDER — OMEPRAZOLE 20 MG/1
CAPSULE, DELAYED RELEASE ORAL
COMMUNITY
Start: 2019-01-01

## 2019-01-01 RX ORDER — FENTANYL CITRATE-0.9 % NACL/PF 10 MCG/ML
100 PLASTIC BAG, INJECTION (ML) INTRAVENOUS CONTINUOUS
Status: DISCONTINUED | OUTPATIENT
Start: 2019-01-01 | End: 2019-01-01 | Stop reason: HOSPADM

## 2019-01-01 RX ORDER — FENTANYL CITRATE 50 UG/ML
50 INJECTION, SOLUTION INTRAMUSCULAR; INTRAVENOUS EVERY 2 HOUR PRN
Status: DISCONTINUED | OUTPATIENT
Start: 2019-01-01 | End: 2019-01-01

## 2019-01-01 RX ORDER — AMLODIPINE BESYLATE 5 MG/1
TABLET ORAL
Qty: 90 TABLET | Refills: 3 | Status: SHIPPED | OUTPATIENT
Start: 2019-01-01

## 2019-01-01 RX ORDER — NITROGLYCERIN 120 MG/1
1 PATCH TRANSDERMAL DAILY
Qty: 30 PATCH | Refills: 3 | Status: SHIPPED | OUTPATIENT
Start: 2019-01-01

## 2019-01-01 RX ORDER — MAGNESIUM SULFATE HEPTAHYDRATE 40 MG/ML
2 INJECTION, SOLUTION INTRAVENOUS ONCE
Status: COMPLETED | OUTPATIENT
Start: 2019-01-01 | End: 2019-01-01

## 2019-01-01 RX ORDER — LEVALBUTEROL 1.25 MG/.5ML
1.25 SOLUTION, CONCENTRATE RESPIRATORY (INHALATION)
Status: DISCONTINUED | OUTPATIENT
Start: 2019-01-01 | End: 2019-01-01

## 2019-01-01 RX ORDER — HYDROMORPHONE HCL/PF 1 MG/ML
1 SYRINGE (ML) INJECTION ONCE
Status: COMPLETED | OUTPATIENT
Start: 2019-01-01 | End: 2019-01-01

## 2019-01-01 RX ORDER — NETARSUDIL 0.2 MG/ML
SOLUTION/ DROPS OPHTHALMIC; TOPICAL
COMMUNITY
Start: 2019-01-01

## 2019-01-01 RX ORDER — FENTANYL CITRATE 50 UG/ML
50 INJECTION, SOLUTION INTRAMUSCULAR; INTRAVENOUS ONCE
Status: COMPLETED | OUTPATIENT
Start: 2019-01-01 | End: 2019-01-01

## 2019-01-01 RX ORDER — AMIODARONE HYDROCHLORIDE 200 MG/1
TABLET ORAL
Qty: 90 TABLET | Refills: 3 | Status: SHIPPED | OUTPATIENT
Start: 2019-01-01 | End: 2019-01-01 | Stop reason: SDUPTHER

## 2019-01-01 RX ORDER — SUCCINYLCHOLINE/SOD CL,ISO/PF 100 MG/5ML
SYRINGE (ML) INTRAVENOUS AS NEEDED
Status: DISCONTINUED | OUTPATIENT
Start: 2019-01-01 | End: 2019-01-01 | Stop reason: SURG

## 2019-01-01 RX ORDER — GABAPENTIN 300 MG/1
300 CAPSULE ORAL 2 TIMES DAILY
Qty: 60 CAPSULE | Refills: 2 | Status: SHIPPED | OUTPATIENT
Start: 2019-01-01 | End: 2020-01-21

## 2019-01-01 RX ORDER — LABETALOL 20 MG/4 ML (5 MG/ML) INTRAVENOUS SYRINGE
10 ONCE
Status: COMPLETED | OUTPATIENT
Start: 2019-01-01 | End: 2019-01-01

## 2019-01-01 RX ORDER — INSULIN GLARGINE 100 [IU]/ML
5 INJECTION, SOLUTION SUBCUTANEOUS ONCE
Status: DISCONTINUED | OUTPATIENT
Start: 2019-01-01 | End: 2019-01-01

## 2019-01-01 RX ORDER — FENTANYL CITRATE 50 UG/ML
100 INJECTION, SOLUTION INTRAMUSCULAR; INTRAVENOUS EVERY 2 HOUR PRN
Status: DISCONTINUED | OUTPATIENT
Start: 2019-01-01 | End: 2019-01-01 | Stop reason: HOSPADM

## 2019-01-01 RX ORDER — LATANOPROST 50 UG/ML
1 SOLUTION/ DROPS OPHTHALMIC
Refills: 2 | COMMUNITY
Start: 2019-01-01 | End: 2019-01-01 | Stop reason: SDUPTHER

## 2019-01-01 RX ORDER — NOREPINEPHRINE BITARTRATE 1 MG/ML
INJECTION, SOLUTION INTRAVENOUS
Status: DISPENSED
Start: 2019-01-01 | End: 2019-01-01

## 2019-01-01 RX ORDER — 3% SODIUM CHLORIDE 3 G/100ML
30 INJECTION, SOLUTION INTRAVENOUS CONTINUOUS
Status: DISCONTINUED | OUTPATIENT
Start: 2019-01-01 | End: 2019-01-01

## 2019-01-01 RX ORDER — NITROGLYCERIN 120 MG/1
1 PATCH TRANSDERMAL DAILY
Qty: 30 PATCH | Refills: 3 | Status: SHIPPED | OUTPATIENT
Start: 2019-01-01 | End: 2019-01-01

## 2019-01-01 RX ORDER — MIDAZOLAM HYDROCHLORIDE 2 MG/2ML
2 INJECTION, SOLUTION INTRAMUSCULAR; INTRAVENOUS EVERY 2 HOUR PRN
Status: DISCONTINUED | OUTPATIENT
Start: 2019-01-01 | End: 2019-01-01

## 2019-01-01 RX ORDER — FENTANYL CITRATE 50 UG/ML
25 INJECTION, SOLUTION INTRAMUSCULAR; INTRAVENOUS EVERY 2 HOUR PRN
Status: DISCONTINUED | OUTPATIENT
Start: 2019-01-01 | End: 2019-01-01

## 2019-01-01 RX ORDER — FUROSEMIDE 40 MG/1
TABLET ORAL
Qty: 90 TABLET | Refills: 3 | Status: SHIPPED | OUTPATIENT
Start: 2019-01-01

## 2019-01-01 RX ADMIN — MIDAZOLAM 2 MG: 1 INJECTION INTRAMUSCULAR; INTRAVENOUS at 11:20

## 2019-01-01 RX ADMIN — IPRATROPIUM BROMIDE 0.5 MG: 0.5 SOLUTION RESPIRATORY (INHALATION) at 08:20

## 2019-01-01 RX ADMIN — LEVALBUTEROL HYDROCHLORIDE 1.25 MG: 1.25 SOLUTION, CONCENTRATE RESPIRATORY (INHALATION) at 14:32

## 2019-01-01 RX ADMIN — DEXMEDETOMIDINE 0.2 MCG/KG/HR: 100 INJECTION, SOLUTION, CONCENTRATE INTRAVENOUS at 11:16

## 2019-01-01 RX ADMIN — CEFTRIAXONE SODIUM 1000 MG: 10 INJECTION, POWDER, FOR SOLUTION INTRAVENOUS at 17:49

## 2019-01-01 RX ADMIN — Medication 50 MCG/HR: at 14:06

## 2019-01-01 RX ADMIN — ALBUMIN (HUMAN) 12.5 G: 12.5 SOLUTION INTRAVENOUS at 02:39

## 2019-01-01 RX ADMIN — GLYCOPYRROLATE 0.2 MG: 0.2 INJECTION, SOLUTION INTRAMUSCULAR; INTRAVENOUS at 17:02

## 2019-01-01 RX ADMIN — DEXMEDETOMIDINE 0.5 MCG/KG/HR: 100 INJECTION, SOLUTION, CONCENTRATE INTRAVENOUS at 01:01

## 2019-01-01 RX ADMIN — PANTOPRAZOLE SODIUM 40 MG: 40 INJECTION, POWDER, FOR SOLUTION INTRAVENOUS at 08:05

## 2019-01-01 RX ADMIN — HYDROMORPHONE HYDROCHLORIDE 2 MG: 2 INJECTION INTRAMUSCULAR; INTRAVENOUS; SUBCUTANEOUS at 18:14

## 2019-01-01 RX ADMIN — CHLORHEXIDINE GLUCONATE 0.12% ORAL RINSE 15 ML: 1.2 LIQUID ORAL at 09:23

## 2019-01-01 RX ADMIN — DEXMEDETOMIDINE 0.7 MCG/KG/HR: 100 INJECTION, SOLUTION, CONCENTRATE INTRAVENOUS at 05:17

## 2019-01-01 RX ADMIN — LEVALBUTEROL HYDROCHLORIDE 1.25 MG: 1.25 SOLUTION, CONCENTRATE RESPIRATORY (INHALATION) at 13:43

## 2019-01-01 RX ADMIN — NOREPINEPHRINE BITARTRATE 4 MCG/MIN: 1 INJECTION INTRAVENOUS at 16:02

## 2019-01-01 RX ADMIN — CHLORHEXIDINE GLUCONATE 0.12% ORAL RINSE 15 ML: 1.2 LIQUID ORAL at 09:37

## 2019-01-01 RX ADMIN — INSULIN LISPRO 1 UNITS: 100 INJECTION, SOLUTION INTRAVENOUS; SUBCUTANEOUS at 17:48

## 2019-01-01 RX ADMIN — IPRATROPIUM BROMIDE 0.5 MG: 0.5 SOLUTION RESPIRATORY (INHALATION) at 08:32

## 2019-01-01 RX ADMIN — ROCURONIUM BROMIDE 50 MG: 50 INJECTION, SOLUTION INTRAVENOUS at 01:16

## 2019-01-01 RX ADMIN — Medication 100 MG: at 01:10

## 2019-01-01 RX ADMIN — ALBUMIN (HUMAN) 12.5 G: 12.5 SOLUTION INTRAVENOUS at 15:10

## 2019-01-01 RX ADMIN — FENTANYL CITRATE 50 MCG: 50 INJECTION, SOLUTION INTRAMUSCULAR; INTRAVENOUS at 17:15

## 2019-01-01 RX ADMIN — CHLORHEXIDINE GLUCONATE 0.12% ORAL RINSE 15 ML: 1.2 LIQUID ORAL at 20:07

## 2019-01-01 RX ADMIN — FENTANYL CITRATE 25 MCG: 50 INJECTION INTRAMUSCULAR; INTRAVENOUS at 11:50

## 2019-01-01 RX ADMIN — IPRATROPIUM BROMIDE 0.5 MG: 0.5 SOLUTION RESPIRATORY (INHALATION) at 13:43

## 2019-01-01 RX ADMIN — LEVALBUTEROL HYDROCHLORIDE 1.25 MG: 1.25 SOLUTION, CONCENTRATE RESPIRATORY (INHALATION) at 19:02

## 2019-01-01 RX ADMIN — DEXMEDETOMIDINE 0.7 MCG/KG/HR: 100 INJECTION, SOLUTION, CONCENTRATE INTRAVENOUS at 15:17

## 2019-01-01 RX ADMIN — METRONIDAZOLE 500 MG: 500 INJECTION, SOLUTION INTRAVENOUS at 02:54

## 2019-01-01 RX ADMIN — FENTANYL CITRATE 25 MCG: 50 INJECTION INTRAMUSCULAR; INTRAVENOUS at 17:18

## 2019-01-01 RX ADMIN — HYDROMORPHONE HYDROCHLORIDE 1 MG: 1 INJECTION, SOLUTION INTRAMUSCULAR; INTRAVENOUS; SUBCUTANEOUS at 17:01

## 2019-01-01 RX ADMIN — SODIUM CHLORIDE 125 ML/HR: 0.9 INJECTION, SOLUTION INTRAVENOUS at 15:03

## 2019-01-01 RX ADMIN — LEVALBUTEROL HYDROCHLORIDE 1.25 MG: 1.25 SOLUTION, CONCENTRATE RESPIRATORY (INHALATION) at 19:29

## 2019-01-01 RX ADMIN — SODIUM CHLORIDE 30 ML/HR: 3 INJECTION, SOLUTION INTRAVENOUS at 10:23

## 2019-01-01 RX ADMIN — DEXMEDETOMIDINE 0.7 MCG/KG/HR: 100 INJECTION, SOLUTION, CONCENTRATE INTRAVENOUS at 08:35

## 2019-01-01 RX ADMIN — ALTEPLASE 80.1 MG: KIT at 00:32

## 2019-01-01 RX ADMIN — FENTANYL CITRATE 25 MCG: 50 INJECTION INTRAMUSCULAR; INTRAVENOUS at 05:06

## 2019-01-01 RX ADMIN — LIDOCAINE HYDROCHLORIDE 300 MG: 10 INJECTION, SOLUTION EPIDURAL; INFILTRATION; INTRACAUDAL; PERINEURAL at 18:03

## 2019-01-01 RX ADMIN — IPRATROPIUM BROMIDE 0.5 MG: 0.5 SOLUTION RESPIRATORY (INHALATION) at 19:02

## 2019-01-01 RX ADMIN — FENTANYL CITRATE 50 MCG: 50 INJECTION, SOLUTION INTRAMUSCULAR; INTRAVENOUS at 21:51

## 2019-01-01 RX ADMIN — LEVALBUTEROL HYDROCHLORIDE 1.25 MG: 1.25 SOLUTION, CONCENTRATE RESPIRATORY (INHALATION) at 00:22

## 2019-01-01 RX ADMIN — SODIUM CHLORIDE 125 ML/HR: 0.9 INJECTION, SOLUTION INTRAVENOUS at 03:57

## 2019-01-01 RX ADMIN — NOREPINEPHRINE BITARTRATE 8 MCG/MIN: 1 INJECTION INTRAVENOUS at 12:52

## 2019-01-01 RX ADMIN — MAGNESIUM SULFATE HEPTAHYDRATE 2 G: 40 INJECTION, SOLUTION INTRAVENOUS at 09:24

## 2019-01-01 RX ADMIN — CHLORHEXIDINE GLUCONATE 0.12% ORAL RINSE 15 ML: 1.2 LIQUID ORAL at 21:34

## 2019-01-01 RX ADMIN — FENTANYL CITRATE 25 MCG: 50 INJECTION INTRAMUSCULAR; INTRAVENOUS at 13:59

## 2019-01-01 RX ADMIN — PANTOPRAZOLE SODIUM 40 MG: 40 INJECTION, POWDER, FOR SOLUTION INTRAVENOUS at 09:23

## 2019-01-01 RX ADMIN — NOREPINEPHRINE BITARTRATE 17 MCG/MIN: 1 INJECTION INTRAVENOUS at 01:02

## 2019-01-01 RX ADMIN — INSULIN LISPRO 1 UNITS: 100 INJECTION, SOLUTION INTRAVENOUS; SUBCUTANEOUS at 18:12

## 2019-01-01 RX ADMIN — METRONIDAZOLE 500 MG: 500 INJECTION, SOLUTION INTRAVENOUS at 09:05

## 2019-01-01 RX ADMIN — Medication 100 MCG/HR: at 11:05

## 2019-01-01 RX ADMIN — PROPOFOL 150 MG: 10 INJECTION, EMULSION INTRAVENOUS at 01:10

## 2019-01-01 RX ADMIN — ACETAMINOPHEN 650 MG: 650 SUSPENSION ORAL at 14:03

## 2019-01-01 RX ADMIN — LEVALBUTEROL HYDROCHLORIDE 1.25 MG: 1.25 SOLUTION, CONCENTRATE RESPIRATORY (INHALATION) at 07:44

## 2019-01-01 RX ADMIN — IPRATROPIUM BROMIDE 0.5 MG: 0.5 SOLUTION RESPIRATORY (INHALATION) at 00:22

## 2019-01-01 RX ADMIN — FENTANYL CITRATE 25 MCG: 50 INJECTION INTRAMUSCULAR; INTRAVENOUS at 14:05

## 2019-01-01 RX ADMIN — METRONIDAZOLE 500 MG: 500 INJECTION, SOLUTION INTRAVENOUS at 01:09

## 2019-01-01 RX ADMIN — FENTANYL CITRATE 100 MCG: 50 INJECTION, SOLUTION INTRAMUSCULAR; INTRAVENOUS at 17:18

## 2019-01-01 RX ADMIN — LIDOCAINE HYDROCHLORIDE 50 MG: 10 INJECTION, SOLUTION INFILTRATION; PERINEURAL at 01:10

## 2019-01-01 RX ADMIN — PANTOPRAZOLE SODIUM 40 MG: 40 INJECTION, POWDER, FOR SOLUTION INTRAVENOUS at 09:32

## 2019-01-01 RX ADMIN — NOREPINEPHRINE BITARTRATE 15 MCG/MIN: 1 INJECTION INTRAVENOUS at 18:45

## 2019-01-01 RX ADMIN — METRONIDAZOLE 500 MG: 500 INJECTION, SOLUTION INTRAVENOUS at 17:19

## 2019-01-01 RX ADMIN — ATORVASTATIN CALCIUM 40 MG: 40 TABLET, FILM COATED ORAL at 18:31

## 2019-01-01 RX ADMIN — MIDAZOLAM 2 MG: 1 INJECTION INTRAMUSCULAR; INTRAVENOUS at 18:02

## 2019-01-01 RX ADMIN — FENTANYL CITRATE 25 MCG: 50 INJECTION INTRAMUSCULAR; INTRAVENOUS at 10:07

## 2019-01-01 RX ADMIN — ACETAMINOPHEN 650 MG: 650 SUSPENSION ORAL at 18:50

## 2019-01-01 RX ADMIN — IPRATROPIUM BROMIDE 0.5 MG: 0.5 SOLUTION RESPIRATORY (INHALATION) at 13:05

## 2019-01-01 RX ADMIN — INSULIN LISPRO 1 UNITS: 100 INJECTION, SOLUTION INTRAVENOUS; SUBCUTANEOUS at 05:52

## 2019-01-01 RX ADMIN — FENTANYL CITRATE 100 MCG: 50 INJECTION, SOLUTION INTRAMUSCULAR; INTRAVENOUS at 14:35

## 2019-01-01 RX ADMIN — IODIXANOL 110 ML: 320 INJECTION, SOLUTION INTRAVASCULAR at 02:13

## 2019-01-01 RX ADMIN — FENTANYL CITRATE 100 MCG: 50 INJECTION, SOLUTION INTRAMUSCULAR; INTRAVENOUS at 18:00

## 2019-01-01 RX ADMIN — METRONIDAZOLE 500 MG: 500 INJECTION, SOLUTION INTRAVENOUS at 09:39

## 2019-01-01 RX ADMIN — GLYCOPYRROLATE 0.2 MG: 0.2 INJECTION, SOLUTION INTRAMUSCULAR; INTRAVENOUS at 12:06

## 2019-01-01 RX ADMIN — LEVALBUTEROL HYDROCHLORIDE 1.25 MG: 1.25 SOLUTION, CONCENTRATE RESPIRATORY (INHALATION) at 00:28

## 2019-01-01 RX ADMIN — SODIUM CHLORIDE: 0.9 INJECTION, SOLUTION INTRAVENOUS at 01:00

## 2019-01-01 RX ADMIN — LEVALBUTEROL HYDROCHLORIDE 1.25 MG: 1.25 SOLUTION, CONCENTRATE RESPIRATORY (INHALATION) at 13:05

## 2019-01-01 RX ADMIN — ACETAMINOPHEN 650 MG: 650 SUSPENSION ORAL at 11:10

## 2019-01-01 RX ADMIN — Medication 100 MCG/HR: at 23:52

## 2019-01-01 RX ADMIN — INSULIN LISPRO 1 UNITS: 100 INJECTION, SOLUTION INTRAVENOUS; SUBCUTANEOUS at 11:57

## 2019-01-01 RX ADMIN — ACETAMINOPHEN 650 MG: 650 SUSPENSION ORAL at 22:53

## 2019-01-01 RX ADMIN — IPRATROPIUM BROMIDE 0.5 MG: 0.5 SOLUTION RESPIRATORY (INHALATION) at 07:44

## 2019-01-01 RX ADMIN — HYDROMORPHONE HYDROCHLORIDE 1 MG: 1 INJECTION, SOLUTION INTRAMUSCULAR; INTRAVENOUS; SUBCUTANEOUS at 17:14

## 2019-01-01 RX ADMIN — FENTANYL CITRATE 50 MCG: 50 INJECTION, SOLUTION INTRAMUSCULAR; INTRAVENOUS at 08:05

## 2019-01-01 RX ADMIN — FENTANYL CITRATE 25 MCG: 50 INJECTION INTRAMUSCULAR; INTRAVENOUS at 09:27

## 2019-01-01 RX ADMIN — DEXMEDETOMIDINE 1 MCG/KG/HR: 100 INJECTION, SOLUTION, CONCENTRATE INTRAVENOUS at 16:18

## 2019-01-01 RX ADMIN — LABETALOL HYDROCHLORIDE 10 MG: 5 INJECTION INTRAVENOUS at 03:51

## 2019-01-01 RX ADMIN — SODIUM CHLORIDE 30 ML/HR: 3 INJECTION, SOLUTION INTRAVENOUS at 05:12

## 2019-01-01 RX ADMIN — IPRATROPIUM BROMIDE 0.5 MG: 0.5 SOLUTION RESPIRATORY (INHALATION) at 19:29

## 2019-01-01 RX ADMIN — DEXMEDETOMIDINE 0.7 MCG/KG/HR: 100 INJECTION, SOLUTION, CONCENTRATE INTRAVENOUS at 21:34

## 2019-01-01 RX ADMIN — INSULIN LISPRO 1 UNITS: 100 INJECTION, SOLUTION INTRAVENOUS; SUBCUTANEOUS at 23:54

## 2019-01-01 RX ADMIN — LEVALBUTEROL HYDROCHLORIDE 1.25 MG: 1.25 SOLUTION, CONCENTRATE RESPIRATORY (INHALATION) at 08:32

## 2019-01-01 RX ADMIN — IOHEXOL 85 ML: 350 INJECTION, SOLUTION INTRAVENOUS at 00:19

## 2019-01-01 RX ADMIN — NOREPINEPHRINE BITARTRATE 5 MCG/MIN: 1 INJECTION INTRAVENOUS at 08:15

## 2019-01-01 RX ADMIN — LIDOCAINE HYDROCHLORIDE 300 MG: 10 INJECTION, SOLUTION INFILTRATION; PERINEURAL at 18:03

## 2019-01-01 RX ADMIN — LEVALBUTEROL HYDROCHLORIDE 1.25 MG: 1.25 SOLUTION, CONCENTRATE RESPIRATORY (INHALATION) at 08:20

## 2019-01-01 RX ADMIN — METRONIDAZOLE 500 MG: 500 INJECTION, SOLUTION INTRAVENOUS at 18:33

## 2019-01-01 RX ADMIN — HYDROMORPHONE HYDROCHLORIDE 2 MG: 2 INJECTION INTRAMUSCULAR; INTRAVENOUS; SUBCUTANEOUS at 17:30

## 2019-01-01 RX ADMIN — INSULIN LISPRO 1 UNITS: 100 INJECTION, SOLUTION INTRAVENOUS; SUBCUTANEOUS at 00:09

## 2019-01-01 RX ADMIN — INSULIN LISPRO 1 UNITS: 100 INJECTION, SOLUTION INTRAVENOUS; SUBCUTANEOUS at 13:06

## 2019-01-01 RX ADMIN — IPRATROPIUM BROMIDE 0.5 MG: 0.5 SOLUTION RESPIRATORY (INHALATION) at 00:28

## 2019-01-01 RX ADMIN — IPRATROPIUM BROMIDE 0.5 MG: 0.5 SOLUTION RESPIRATORY (INHALATION) at 14:32

## 2019-01-01 RX ADMIN — CHLORHEXIDINE GLUCONATE 0.12% ORAL RINSE 15 ML: 1.2 LIQUID ORAL at 08:04

## 2019-01-01 RX ADMIN — DEXMEDETOMIDINE 0.7 MCG/KG/HR: 100 INJECTION, SOLUTION, CONCENTRATE INTRAVENOUS at 17:30

## 2019-01-01 RX ADMIN — FENTANYL CITRATE 50 MCG: 50 INJECTION, SOLUTION INTRAMUSCULAR; INTRAVENOUS at 11:07

## 2019-01-01 RX ADMIN — MIDAZOLAM HYDROCHLORIDE 2 MG: 2 INJECTION, SOLUTION INTRAMUSCULAR; INTRAVENOUS at 11:20

## 2019-01-01 RX ADMIN — Medication 25 MCG/HR: at 18:41

## 2019-01-01 RX ADMIN — CEFTRIAXONE SODIUM 1000 MG: 10 INJECTION, POWDER, FOR SOLUTION INTRAVENOUS at 20:03

## 2019-01-01 RX ADMIN — SODIUM CHLORIDE 1000 ML: 0.9 INJECTION, SOLUTION INTRAVENOUS at 08:00

## 2019-03-05 PROBLEM — I48.20 CHRONIC ATRIAL FIBRILLATION (HCC): Status: RESOLVED | Noted: 2019-01-01 | Resolved: 2019-01-01

## 2019-03-05 PROBLEM — I25.10 CAD, MULTIPLE VESSEL: Status: RESOLVED | Noted: 2018-02-15 | Resolved: 2019-01-01

## 2019-03-05 PROBLEM — I48.20 CHRONIC ATRIAL FIBRILLATION (HCC): Status: ACTIVE | Noted: 2019-01-01

## 2019-03-05 PROBLEM — I25.118 CORONARY ARTERY DISEASE OF NATIVE ARTERY OF NATIVE HEART WITH STABLE ANGINA PECTORIS (HCC): Status: ACTIVE | Noted: 2019-01-01

## 2019-03-05 NOTE — PROGRESS NOTES
Cardiology Follow Up    Rox Mijares  1933  [id number]  [address] [facility] Marylou Pedersen [phone] [facility] [id number] [phone]    1  Essential hypertension     2  Coronary artery disease of native artery of native heart with stable angina pectoris (HCC)  POCT ECG   3  Nonrheumatic aortic valve stenosis     4  S/P AVR  Echo complete with contrast if indicated   5  Hx of CABG     6  Abnormal nuclear stress test     7  Chronic atrial fibrillation (HCC)           Discussion/Summary: I will d/c his Amiodarone as he is now in CAF and HR is controlled  We discussed the option of cardiac cath  His angina is stable and we decided to continue with medical management  I will repeat an ECHO  He still refuses AC   RTO 6 months  Interval History: He remains in active  He continues to get occasional exertional CP which is relieved with using a Nitrodur patch  He has CAD with remote CABG / AVR  He is now in CAF but refuses to take [facility]  He had a large R arm bleed when he was on [facility] previously  ECG today showed AF with HR 68 BPM   BP is well controlled  Last ECHO - 8/2017  EF 50-55%    Patient Active Problem List   Diagnosis    Nonrheumatic aortic valve stenosis    Essential hypertension    Abnormal nuclear stress test    Hx of CABG    S/P AVR    Coronary artery disease of native artery of native heart with stable angina pectoris ([facility] [address] )     No past medical history on file  Social History     Socioeconomic History    Marital status:       Spouse name: Not on file    Number of children: Not on file    Years of education: Not on file    Highest education level: Not on file   Occupational History    Not on file   Social Needs    Financial resource strain: Not on file    Food insecurity:     Worry: Not on file     Inability: Not on file    Transportation needs:     Medical: Not on file     Non-medical: Not on file Tobacco Use    Smoking status: Former Smoker    Smokeless tobacco: Never Used   Substance and Sexual Activity    Alcohol use: No    Drug use: No    Sexual activity: Not on file   Lifestyle    Physical activity:     Days per week: Not on file     Minutes per session: Not on file    Stress: Not on file   Relationships    Social connections:     Talks on phone: Not on file     Gets together: Not on file     Attends Yazdanism service: Not on file     Active member of club or organization: Not on file     Attends meetings of clubs or organizations: Not on file     Relationship status: Not on file    Intimate partner violence:     Fear of current or ex partner: Not on file     Emotionally abused: Not on file     Physically abused: Not on file     Forced sexual activity: Not on file   Other Topics Concern    Not on file   Social History Narrative    Not on file      No family history on file  No past surgical history on file      Current Outpatient Medications:     amLODIPine (NORVASC) 5 mg tablet, TAKE ONE TABLET BY MOUTH DAILY, Disp: 90 tablet, Rfl: 3    aspirin 81 MG tablet, Take 1 tablet by mouth daily, Disp: , Rfl:     brimonidine (ALPHAGAN P) 0 15 % ophthalmic solution, Apply 1 drop to eye, Disp: , Rfl:     calcium carbonate (OS-JESUS) 1250 (500 Ca) MG tablet, Take 1 tablet by mouth, Disp: , Rfl:     Cholecalciferol (VITAMIN D3) 5000 units CAPS, Take 1 capsule by mouth daily, Disp: , Rfl:     docusate sodium (COLACE) 100 mg capsule, Take 100 mg by mouth, Disp: , Rfl:     dorzolamide (TRUSOPT) 2 % ophthalmic solution, Apply 1 drop to eye, Disp: , Rfl:     furosemide (LASIX) 40 mg tablet, TAKE ONE TABLET BY MOUTH ONCE DAILY, Disp: 90 tablet, Rfl: 3    gabapentin (NEURONTIN) 300 mg capsule, Take 300 mg by mouth, Disp: , Rfl:     metoprolol succinate (TOPROL-XL) 25 mg 24 hr tablet, TAKE ONE TABLET BY MOUTH ONCE DAILY, Disp: 30 tablet, Rfl: 11    Multiple Vitamins-Minerals (PRESERVISION AREDS 2+MULTI VIT) CAPS, Take by mouth, Disp: , Rfl:     nitroglycerin (NITRODUR) 0 6 mg/hr, Place 1 patch on the skin daily, Disp: , Rfl:     Omega-3 Fatty Acids (FISH OIL) 645 MG CAPS, Take 2 tablets by mouth daily, Disp: , Rfl:     omeprazole (PriLOSEC) 20 mg delayed release capsule, , Disp: , Rfl:     RHOPRESSA 0 02 % SOLN, , Disp: , Rfl:     rOPINIRole (REQUIP) 0 25 mg tablet, Take 0 25 mg by mouth 2 (two) times a day, Disp: , Rfl:     rosuvastatin (CRESTOR) 10 MG tablet, Take 10 mg by mouth, Disp: , Rfl:     ERGOCALCIFEROL PO, Take 50,000 Units by mouth, Disp: , Rfl:   Allergies   Allergen Reactions    Penicillins Rash     rash     Vitals:    03/05/19 0826   BP: 126/60   BP Location: Left arm   Patient Position: Sitting   Cuff Size: Large   Pulse: 76   Weight: 100 kg (221 lb)   Height: 5' 10" (1 778 m)     Weight (last 2 days)     Date/Time   Weight    03/05/19 0826   100 (221)             Blood pressure 126/60, pulse 76, height 5' 10" (1 778 m), weight 100 kg (221 lb)  , Body mass index is 31 71 kg/m²  Labs:  Office Visit on 09/19/2018   Component Date Value    INTERPRETATIONS 09/19/2018       Imaging: No results found  Review of Systems:  Review of Systems   Constitutional: Negative for diaphoresis, fatigue, fever and unexpected weight change  HENT: Negative  Respiratory: Positive for chest tightness  Negative for cough, shortness of breath and wheezing  Cardiovascular: Negative for chest pain, palpitations and leg swelling  Gastrointestinal: Negative for abdominal pain, diarrhea and nausea  Musculoskeletal: Negative for gait problem and myalgias  Skin: Negative for rash  Neurological: Negative for dizziness and numbness  Psychiatric/Behavioral: Negative  Physical Exam:  Physical Exam   Constitutional: He is oriented to person, place, and time  He appears well-developed and well-nourished  HENT:   Head: Normocephalic and atraumatic     Eyes: Pupils are equal, round, and reactive to light    Neck: Normal range of motion  Neck supple  No JVD present  Cardiovascular: S1 normal, S2 normal and normal pulses  An irregularly irregular rhythm present  Pulses:       Carotid pulses are 2+ on the right side, and 2+ on the left side  Pulmonary/Chest: Effort normal and breath sounds normal  He has no wheezes  He has no rales  Abdominal: Soft  Bowel sounds are normal  There is no tenderness  Musculoskeletal: Normal range of motion  He exhibits no edema or tenderness  Neurological: He is alert and oriented to person, place, and time  He has normal reflexes  No cranial nerve deficit  Skin: Skin is warm  Psychiatric: He has a normal mood and affect

## 2019-07-08 NOTE — PROGRESS NOTES
Established patient with class findings presents for mycotic toenail care  Vascular exam:  DP 0 4 bilateral; PT 0 4 bilateral  Derm exam:  Each toenail is thick, yellow with subungual debris  Diagnoses:  Mycotic toenails x 10; PVD  Treatment:  Debrided each mycotic toenail reducing nails in thickness and removing devitalized tissue and subungual debris   Procedures

## 2019-09-10 NOTE — PROGRESS NOTES
Cardiology Follow Up    Julienne Mac  1933  [id number]  [address] [facility] [place]  [address] [facility] [address]  [address] [facility] [address]  [phone]    1  Coronary artery disease of native artery of native heart with stable angina pectoris ([facility] )     2  Essential hypertension     3  Nonrheumatic aortic valve stenosis     4  Hx of CABG     5  S/P AVR           Discussion/Summary: All of his assessed cardiac problems are stable  I have reviewed his medications and made no changes  No further cardiac testing is needed at this time  He was advised to call if his CP symptoms worsen in any way  RTO 9 months  Interval History: He has not had any cardiac problems since his last OV  He gets rare exertional CP relieved with a Nitrodur patch  Recent ECHO - EF 50 % , normal function of his bioprosthetic   He has CAD and AS with AVR and CABG several years ago  Patient Active Problem List   Diagnosis    Nonrheumatic aortic valve stenosis    Essential hypertension    Abnormal nuclear stress test    Hx of CABG    S/P AVR    Coronary artery disease of native artery of native heart with stable angina pectoris [facility])     Past Medical History:   Diagnosis Date    Polyneuropathy     PVD (peripheral vascular disease) ([facility] )      Social History     Socioeconomic History    Marital status:       Spouse name: Not on file    Number of children: Not on file    Years of education: Not on file    Highest education level: Not on file   Occupational History    Not on file   Social Needs    Financial resource strain: Not on file    Food insecurity:     Worry: Not on file     Inability: Not on file    Transportation needs:     Medical: Not on file     Non-medical: Not on file   Tobacco Use    Smoking status: Former Smoker     Types: Cigarettes     Last attempt to quit:      Years since quittin 7    Smokeless tobacco: Never Used Substance and Sexual Activity    Alcohol use: No    Drug use: No    Sexual activity: Not on file   Lifestyle    Physical activity:     Days per week: Not on file     Minutes per session: Not on file    Stress: Not on file   Relationships    Social connections:     Talks on phone: Not on file     Gets together: Not on file     Attends Scientology service: Not on file     Active member of club or organization: Not on file     Attends meetings of clubs or organizations: Not on file     Relationship status: Not on file    Intimate partner violence:     Fear of current or ex partner: Not on file     Emotionally abused: Not on file     Physically abused: Not on file     Forced sexual activity: Not on file   Other Topics Concern    Not on file   Social History Narrative    Not on file      No family history on file  No past surgical history on file      Current Outpatient Medications:     amLODIPine (NORVASC) 5 mg tablet, TAKE ONE TABLET BY MOUTH DAILY, Disp: 90 tablet, Rfl: 3    aspirin 81 MG tablet, Take 1 tablet by mouth daily, Disp: , Rfl:     brimonidine (ALPHAGAN P) 0 15 % ophthalmic solution, Apply 1 drop to eye, Disp: , Rfl:     calcium carbonate (OS-JESUS) 1250 (500 Ca) MG tablet, Take 1 tablet by mouth, Disp: , Rfl:     Cholecalciferol (VITAMIN D3) 5000 units CAPS, Take 1 capsule by mouth daily Take one tablet every other day, Disp: , Rfl:     dorzolamide (TRUSOPT) 2 % ophthalmic solution, Apply 1 drop to eye, Disp: , Rfl:     furosemide (LASIX) 40 mg tablet, TAKE 1 TABLET BY MOUTH ONCE DAILY, Disp: 90 tablet, Rfl: 3    gabapentin (NEURONTIN) 300 mg capsule, Take 300 mg by mouth, Disp: , Rfl:     latanoprost (XALATAN) 0 005 % ophthalmic solution, Administer 1 drop to both eyes daily at bedtime, Disp: , Rfl: 2    metoprolol succinate (TOPROL-XL) 25 mg 24 hr tablet, TAKE 1 TABLET BY MOUTH ONCE DAILY, Disp: 90 tablet, Rfl: 3    Multiple Vitamins-Minerals (PRESERVISION AREDS 2+MULTI VIT) CAPS, Take by mouth, Disp: , Rfl:     Omega-3 Fatty Acids (FISH OIL) 645 MG CAPS, Take 2 tablets by mouth daily, Disp: , Rfl:     omeprazole (PriLOSEC) 20 mg delayed release capsule, TAKE 1 CAPSULE BY MOUTH ONCE DAILY, Disp: , Rfl:     RHOPRESSA 0 02 % SOLN, , Disp: , Rfl:     rOPINIRole (REQUIP) 0 25 mg tablet, Take 0 25 mg by mouth 2 (two) times a day, Disp: , Rfl:     rosuvastatin (CRESTOR) 10 MG tablet, Take 10 mg by mouth, Disp: , Rfl:     nitroglycerin (NITRODUR) 0 6 mg/hr, Place 1 patch on the skin daily (Patient not taking: Reported on 9/10/2019), Disp: 30 patch, Rfl: 3  Allergies   Allergen Reactions    Penicillins Rash     rash     Vitals:    09/10/19 1357   BP: 136/60   BP Location: Left arm   Cuff Size: Standard   Pulse: 68   Weight: 96 6 kg (213 lb)   Height: 5' 10" (1 778 m)     Weight (last 2 days)     Date/Time   Weight    09/10/19 1357   96 6 (213)             Blood pressure 136/60, pulse 68, height 5' 10" (1 778 m), weight 96 6 kg (213 lb)  , Body mass index is 30 56 kg/m²  Labs:  No visits with results within 6 Month(s) from this visit  Latest known visit with results is:   Office Visit on 03/05/2019   Component Date Value    INTERPRETATIONS 03/05/2019       Imaging: No results found  Review of Systems:  Review of Systems   Constitutional: Negative for diaphoresis, fatigue, fever and unexpected weight change  HENT: Negative  Respiratory: Negative for cough, shortness of breath and wheezing  Cardiovascular: Positive for chest pain  Negative for palpitations and leg swelling  Gastrointestinal: Negative for abdominal pain, diarrhea and nausea  Musculoskeletal: Negative for gait problem and myalgias  Skin: Negative for rash  Neurological: Negative for dizziness and numbness  Psychiatric/Behavioral: Negative  Physical Exam:  Physical Exam   Constitutional: He is oriented to person, place, and time  He appears well-developed and well-nourished     HENT:   Head: Normocephalic and atraumatic  Eyes: Pupils are equal, round, and reactive to light  Neck: Normal range of motion  Neck supple  No JVD present  Cardiovascular: Regular rhythm, S1 normal, S2 normal and normal pulses  Pulses:       Carotid pulses are 2+ on the right side, and 2+ on the left side  Pulmonary/Chest: Effort normal and breath sounds normal  He has no wheezes  He has no rales  Abdominal: Soft  Bowel sounds are normal  There is no tenderness  Musculoskeletal: Normal range of motion  He exhibits no edema or tenderness  Neurological: He is alert and oriented to person, place, and time  He has normal reflexes  No cranial nerve deficit  Skin: Skin is warm  Psychiatric: He has a normal mood and affect

## 2019-11-30 PROBLEM — I63.9 ISCHEMIC STROKE (HCC): Status: ACTIVE | Noted: 2019-01-01

## 2019-11-30 PROBLEM — J96.01 ACUTE RESPIRATORY FAILURE WITH HYPOXIA (HCC): Status: ACTIVE | Noted: 2019-01-01

## 2019-11-30 NOTE — CONSULTS
Consultation - Neurology   Amadou Jerez 80 y o  male MRN: [id number]  Unit/Bed#: ICU 12 Encounter: [id number]      Assessment/Plan   Assessment:  80year old man with CAD, carotid stenosis, s/p L CEA in 2009, now with newly discovered afib, presenting with R MCA syndrome, now s/p TPA and endovascular thrombectomy  Etiology at this time could be either the carotid stenosis or the afib       -post-TPA care, BP <180/110, no DVT prophylaxis, no antiplatelets 37E, repeat CTH at 24h  -post thrombectomy care, rec's per neurosurgery, TICI 3 in MCA, but TICI 2b in the GI  May benefit from slightly higher MAP goals  -echo pending  -MRI brain pending  -would start a statin  -supportive care per ICU      History of Present Illness     Reason for Consult / Principal Problem: L sided weakness  Hx and PE limited by: Patient intubated, on fentanyl PRN for agitation  HPI: Amadou Jerez is a 80 y o  right handed male with CAD s/p CABG, R carotid stenosis, hx of L carotid stenosis s/p LCEA, who presented yesterday evening with acute onset L sided symptoms  Per report (no family at bedside this AM), he was found on the road with his car pulled over, with confusion and weakness  His family reported a last known well time of 11:45pm, and he presented to the ED around 12:15am   In ED, his NIHSS was about 12  He had R gaze deviation, L hemiparesis, and L sided neglect  His CTH showed a hyperdense distal R ICA, and an ASPECT score of 10  He had a CTA H&N which showed occlusion of the R ICA at the distal mid-cavernous portion, extending into the L MCA M1  There was cross filling of the GI A1 via the Acomm  He was given TPA infusion, and endovascular thrombectomy was performed  The MCA was recanalized to TICI 3, but the R GI remained occluded in an A2 branch  He was admitted to the ICU for post TPA care  Overnight, he was noted in sinus tach, but this AM he appears to be in afib        ROS unable to perform as patient remains intubated  Consults    Review of Systems    Historical Information   Past Medical History:   Diagnosis Date    Hypertension     Polyneuropathy     PVD (peripheral vascular disease) ([facility] [address] )     Stroke ([facility] [address] )     approx      Past Surgical History:   Procedure Laterality Date    CAROTID ENDARTARECTOMY Left     approx 2009     Social History   Social History     Substance and Sexual Activity   Alcohol Use No    Frequency: Never    Binge frequency: Never     Social History     Substance and Sexual Activity   Drug Use No     Social History     Tobacco Use   Smoking Status Former Smoker    Types: Cigarettes    Last attempt to quit: Kimberly Cuff Years since quittin 9   Smokeless Tobacco Never Used     Family History: History reviewed  No pertinent family history      Meds/Allergies   current meds:   Current Facility-Administered Medications   Medication Dose Route Frequency    atorvastatin (LIPITOR) tablet 40 mg  40 mg Oral QPM    chlorhexidine (PERIDEX) 0 12 % oral rinse 15 mL  15 mL Swish & Spit Q12H [facility]    dexmedetomidine (PRECEDEX) 400 mcg in sodium chloride 0 9 % 100 mL infusion  0 1-0 5 mcg/kg/hr Intravenous Titrated    fentanyl citrate (PF) 100 MCG/2ML 25 mcg  25 mcg Intravenous Q2H PRN    hydrALAZINE (APRESOLINE) injection 10 mg  10 mg Intravenous Q6H PRN    insulin lispro (HumaLOG) 100 units/mL subcutaneous injection 1-5 Units  1-5 Units Subcutaneous Q6H [facility]    ipratropium (ATROVENT) 0 02 % inhalation solution 0 5 mg  0 5 mg Nebulization Q6H    Labetalol HCl (NORMODYNE) injection 10 mg  10 mg Intravenous Q6H PRN    levalbuterol (XOPENEX) inhalation solution 1 25 mg  1 25 mg Nebulization Q6H    magnesium sulfate 2 g/50 mL IVPB (premix) 2 g  2 g Intravenous Once    norepinephrine (LEVOPHED) 1 mg/mL injection **ADS Override Pull**        pantoprazole (PROTONIX) injection 40 mg  40 mg Intravenous Q24H [facility]    sodium chloride 0 9 % infusion  125 mL/hr Intravenous Continuous    and PTA meds:   Prior to Admission Medications   Prescriptions Last Dose Informant Patient Reported? Taking?    Cholecalciferol (VITAMIN D3) 5000 units CAPS 11/29/2019 at 0900 Self Yes Yes   Sig: Take 1 capsule by mouth daily Take one tablet every other day   LATANOPROST OP Unknown at Unknown time  Yes No   Sig: Apply 1 drop to eye   Multiple Vitamins-Minerals (PRESERVISION AREDS 2+MULTI VIT) CAPS 11/29/2019 at 0900 Self Yes Yes   Sig: Take by mouth   Omega-3 Fatty Acids (FISH OIL) 645 MG CAPS 11/29/2019 at 0900 Self Yes Yes   Sig: Take 2 tablets by mouth daily   RHOPRESSA 0 02 % SOLN Unknown at Unknown time Self Yes No   amLODIPine (NORVASC) 5 mg tablet 11/29/2019 at 0900  No Yes   Sig: TAKE 1 TABLET BY MOUTH DAILY   aspirin 81 MG tablet 11/29/2019 at 0900 Self Yes Yes   Sig: Take 1 tablet by mouth daily   brimonidine (ALPHAGAN P) 0 15 % ophthalmic solution Unknown at Unknown time Self Yes No   Sig: Apply 1 drop to eye   calcium carbonate (OS-JESUS) 1250 (500 Ca) MG tablet 11/29/2019 at 0900 Self Yes Yes   Sig: Take 1 tablet by mouth   dorzolamide (TRUSOPT) 2 % ophthalmic solution Unknown at Unknown time Self Yes No   Sig: Apply 1 drop to eye   furosemide (LASIX) 40 mg tablet 11/29/2019 at 0900 Self No Yes   Sig: TAKE 1 TABLET BY MOUTH ONCE DAILY   gabapentin (NEURONTIN) 300 mg capsule Unknown at Unknown time Self Yes No   Sig: Take 300 mg by mouth   gabapentin (NEURONTIN) 300 mg capsule Unknown at Unknown time  No No   Sig: Take 1 capsule (300 mg total) by mouth 2 (two) times a day   metoprolol succinate (TOPROL-XL) 25 mg 24 hr tablet 11/29/2019 at 0900 Self No Yes   Sig: TAKE 1 TABLET BY MOUTH ONCE DAILY   nitroglycerin (NITRODUR) 0 6 mg/hr Unknown at Unknown time Self No No   Sig: Place 1 patch on the skin daily   omeprazole (PriLOSEC) 20 mg delayed release capsule 11/29/2019 at 0900 Self Yes Yes   Sig: TAKE 1 CAPSULE BY MOUTH ONCE DAILY   rOPINIRole (REQUIP) 0 25 mg tablet 11/28/2019 at 2100 Self Yes Yes   Sig: Take 0 25 mg by mouth daily at bedtime    rosuvastatin (CRESTOR) 10 MG tablet 11/29/2019 at 1700 Self Yes Yes   Sig: Take 10 mg by mouth      Facility-Administered Medications: None       Allergies   Allergen Reactions    Penicillins Rash     rash       Objective   Vitals:Blood pressure 116/93, pulse 104, temperature 99 °F (37 2 °C), resp  rate (!) 23, height 5' 10" (1 778 m), weight 99 1 kg (218 lb 7 6 oz), SpO2 97 %  ,Body mass index is 31 35 kg/m²  Intake/Output Summary (Last 24 hours) at 11/30/2019 1026  Last data filed at 11/30/2019 1000  Gross per 24 hour   Intake 921 67 ml   Output 1075 ml   Net -153 33 ml       Invasive Devices: Invasive Devices     Peripheral Intravenous Line            Peripheral IV 11/30/19 Left Antecubital less than 1 day    Peripheral IV 11/30/19 Right Hand less than 1 day          Arterial Line            Arterial Line 11/30/19 Left Radial less than 1 day          Drain            NG/OG Tube Orogastric Right mouth less than 1 day    NG/OG/Enteral Tube Orogastric 18 Fr Right mouth less than 1 day    Urethral Catheter Temperature probe 16 Fr  less than 1 day          Airway            ETT  Cuffed; Hi-Lo 8 mm less than 1 day                Physical Exam   GEN: intubated, not on sedation, PRN fentanyl  CV: irregularly irregular, symmetric pulses, no carotid bruit  PULM :intubated, course breath sounds  GI: soft, non-tender  SKIN: no rashes  EXTREM: no CCE, no stigmata of emboli  HEENT: atraumatic, no nuchal rigidity    Neurologic Exam  Does not open eyes to stimulus  Follows commands on the R, two fingers and thumbs up  Pupils 2mm, minimally reactive, does not blink to threat, no papilledema, L sided facial weakness on grimace, occulocephalic reflex present, corneal present  MOTOR: flaccid L arm and leg  R arm exhibits 5/5 strength, , biceps  L arm without any withdrawal to stimulus  R leg is antigravity  L leg is withdrawing from stimulus, 2/5     SENSORY: has grimace to noxious stimulus throughout  GAIT: deferred  COORDINATION: unable to assess  REFLEXES: l toe is upgoing  Lab Results:   CBC:   Results from last 7 days   Lab Units 11/30/19  0843 11/30/19 0525 11/30/19 0016 11/30/19 0015   WBC Thousand/uL  --  20 71*  --  11 31*   RBC Million/uL  --  5 01  --  4 51   HEMOGLOBIN g/dL 14 5 15 9  --  14 3   I STAT HEMOGLOBIN g/dl  --   --  14 6 15 0   HEMATOCRIT % 43 1 48 0  --  42 6   HEMATOCRIT, ISTAT %  --   --  43 44   MCV fL  --  96  --  95   PLATELETS Thousands/uL  --  297  --  252   , BMP/CMP:   Results from last 7 days   Lab Units 11/30/19 0843 11/30/19 0525 11/30/19 0016 11/30/19 0015   SODIUM mmol/L 139 136  --  138   POTASSIUM mmol/L 4 0 5 0  --  4 5   CHLORIDE mmol/L 112* 106  --  105   CO2 mmol/L 20* 22  --  27   CO2, I-STAT mmol/L  --   --  27 25   BUN mg/dL 24 24  --  23   CREATININE mg/dL 1 27 1 30  --  1 40*   GLUCOSE, ISTAT mg/dl  --   --  109 109   CALCIUM mg/dL 7 6* 8 2*  --  9 2   EGFR ml/min/1 73sq m 51 49  --  49  45     Imaging Studies: I have personally reviewed pertinent films in PACS     [facility] images reviewed: hyperdense sign in distal R ICA  Otherwise, no hemorrhage or other acute intracranial pathology  CTA H&N images reviewed: R carotid stenosis, distal ICA occlusion extending into r MCA

## 2019-11-30 NOTE — PROGRESS NOTES
CC: R MCA syndrome  HPI:  51-year-old male with prior CABG, right carotid stenosis, hypertension, and AVR who presented with acute left-sided weakness at approximately 11:45 p m , 1 hour prior to intervention  CTA revealed a right ICA terminus occlusion  NIH SS 14  Received IV tPA  Review of systems, further medical and surgical history unobtainable  He is allergic to penicillins  He takes aspirin at home  Not on anticoagulation  Labs within normal limits  HR:  [] 104  Resp:  [18-22] 18  BP: (170-174)/(81-88) 172/81   He is somnolent but arouses  He speaks in comprehensively  He does not follow commands  He spontaneously and purposefully moves his right side  Minimal movement to pain or stimulation with his left upper and lower extremity  He has forced gaze deviation to the right  I had an extensive conversation with multiple family members including his sons regarding the risks and benefits associated with a cerebral arteriogram, possible thrombectomy, and possible carotid stent  They understand that the risks of these procedures include bleeding, infection, stroke, paralysis, intracranial hemorrhage, and death  They also understand that there is a risk of intubation due to his poor oxygen saturation and difficulty protecting his airway  They elected to proceed  Verbal consent was witnessed and obtained through the patient access center

## 2019-11-30 NOTE — NUTRITION
11/30/19 3017   Recommendations/Interventions   Nutrition Recommendations Other (specify)  (if unable to extubate and advance diet rec tube feeding with Jevity 1 2 @10ml/hr advance as tolerated to goal rate 65ml/hr to provide 1560ml, 1872kcal, 87g pro, 1264ml free water   Monitor weight and electrolytes  )

## 2019-11-30 NOTE — ED ATTENDING ATTESTATION
11/30/2019  I, Emily Cooper MD, saw and evaluated the patient  I have discussed the patient with the resident/non-physician practitioner and agree with the resident's/non-physician practitioner's findings, Plan of Care, and MDM as documented in the resident's/non-physician practitioner's note, except where noted  All available labs and Radiology studies were reviewed  I was present for key portions of any procedure(s) performed by the resident/non-physician practitioner and I was immediately available to provide assistance  At this point I agree with the current assessment done in the Emergency Department  I have conducted an independent evaluation of this patient a history and physical is as follows:  30 minutes pta pt started with slurred speech L sided weakness r sided gaze preference  history of afib on asa per medical record and med list 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; alert mild confusion r sided gaze preference L facial droop heart irreg irreg lungs clear L sided arm weakness greater than leg  MDM:  Ct cta nuero with stroke alert called  Per neurology hang tpa and an intra op lab alert will be called with neuro surgery  ED Course         Critical Care Time  CriticalCare Time  Performed by: Emily Cooper MD  Authorized by:  Emily Cooper MD     Critical care provider statement:     Critical care time (minutes):  35    Critical care time was exclusive of:  Separately billable procedures and treating other patients and teaching time    Critical care was necessary to treat or prevent imminent or life-threatening deterioration of the following conditions:  CNS failure or compromise    Critical care was time spent personally by me on the following activities:  Obtaining history from patient or surrogate, discussions with consultants, examination of patient, ordering and performing treatments and interventions, re-evaluation of patient's condition and review of old charts

## 2019-11-30 NOTE — PROGRESS NOTES
Progress Note - Neurosurgery   Suzanna Guajardo 80 y o  male MRN: [id number]  Unit/Bed#: ICU 12 Encounter: [id number]    Assessment:    POD 0, right ICA/MCA thrombectomy this AM  Peasant 80 y o  male who presented with acute right MCA syndrome  NIHSS was 14  CTA was significant for right ICA terminus LVO  Borderline cardiac function as per echo  Patient remains intubated/ventilated  Obeys well with right side  Plan:    1  Medical management as per ICU/neurovascular team     2  Repeat CT head ordered/decline in GCS  3  Wean to extubate once respiratory parameters met    4  Neurovascular/neurosurgery continue to follow  Subjective/Objective   Chief Complaint: intubated    Subjective: cooperative    Objective: obeys    Intake/Output       11/30/19 0701 - 12/01/19 0700      [id number] 4829-9569 Total       Intake    P O   0  -- 0    I V   415 4  -- 415 4    IV Piggyback  50  -- 50    Total Intake 465 4 -- 465 4       Output    Urine  275  -- 275    Output (mL) (Urethral Catheter Temperature probe 16 Fr ) 275 -- 275    Emesis/NG output  0  -- 0    Output (mL) (NG/OG/Enteral Tube Orogastric 18 Fr Right mouth) 0 -- 0    Total Output 275 -- 275       Net I/O     190 4 -- 190 4          Invasive Devices     Peripheral Intravenous Line            Peripheral IV 11/30/19 Left Antecubital less than 1 day    Peripheral IV 11/30/19 Right Hand less than 1 day          Arterial Line            Arterial Line 11/30/19 Left Radial less than 1 day          Drain            NG/OG Tube Orogastric Right mouth less than 1 day    NG/OG/Enteral Tube Orogastric 18 Fr Right mouth less than 1 day    Urethral Catheter Temperature probe 16 Fr  less than 1 day          Airway            ETT  Cuffed; Hi-Lo 8 mm less than 1 day                Physical Exam: /96   Pulse (!) 106   Temp 99 °F (37 2 °C)   Resp (!) 24   Ht 5' 10" (1 778 m)   Wt 99 1 kg (218 lb 7 6 oz)   SpO2 97%   BMI 31 35 kg/m²     Intubated and ventilated  Eyes open to voice  Obeys well with right UE and LE  No purposeful mov't left side  CESAR @ 3 mm      Head:    Normocephalic, without obvious abnormality, atraumatic   Eyes:    PERRL, conjunctiva/corneas clear, EOM's intact, fundi     benign, both eyes        Ears:    Normal TM's and external ear canals, both ears   Nose:   Nares normal, septum midline, mucosa normal, no drainage    or sinus tenderness   Throat:   Lips, mucosa, and tongue normal; teeth and gums normal   Neck:   Supple, symmetrical, trachea midline, no adenopathy;        thyroid:  No enlargement/tenderness/nodules; no carotid    bruit or JVD                               Extremities:   Extremities normal, atraumatic, no cyanosis or edema   Pulses:   2+ and symmetric all extremities   Skin:   Skin color, texture, turgor normal, no rashes or lesions               Vitals: Blood pressure 133/96, pulse (!) 106, temperature 99 °F (37 2 °C), resp  rate (!) 24, height 5' 10" (1 778 m), weight 99 1 kg (218 lb 7 6 oz), SpO2 97 %  ,Body mass index is 31 35 kg/m²  Hemodynamic Monitoring: MAP: Arterial Line MAP (mmHg): 98 mmHg    Lab Results:   Lab Results   Component Value Date    WBC 20 71 (H) 11/30/2019    HGB 14 5 11/30/2019    HCT 43 1 11/30/2019    MCV 96 11/30/2019     11/30/2019    MCH 31 7 11/30/2019    MCHC 33 1 11/30/2019    RDW 14 0 11/30/2019    MPV 9 5 11/30/2019    NRBC 0 11/30/2019    SODIUM 139 11/30/2019     (H) 11/30/2019    CO2 20 (L) 11/30/2019    BUN 24 11/30/2019    CREATININE 1 27 11/30/2019    GLUCOSE 109 11/30/2019    CALCIUM 7 6 (L) 11/30/2019    EGFR 51 11/30/2019    INR 1 02 11/30/2019       Imaging Studies: I have personally reviewed pertinent films in PACS    EKG, Pathology, and Other Studies: I have personally reviewed pertinent reports        VTE Pharmacologic Prophylaxis: Sequential compression device (Venodyne)     VTE Mechanical Prophylaxis: sequential compression device
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
## 2019-11-30 NOTE — RESPIRATORY THERAPY NOTE
resp care      11/30/19 1434   Respiratory Assessment   Resp Comments pt transported to ct scan via portable vent and an ambubag, pt returned from ct scan, placed back on vent, no problems or issues, will continue to monitor   O2 Device vent   ETT  Cuffed; Hi-Lo 8 mm   Placement Date/Time: 11/30/19 0110   Mask Ventilation: Mask ventilation not attempted (0)  Preoxygenated: Yes  Technique: Rapid sequence  Type: Cuffed; Hi-Lo  Tube Size: 8 mm  Laryngoscope: [place]  Blade Size: 3  Location: Oral  Grade View: Full view        Secured at (cm) 24   Measured from [address] by [employer] tube gao   Site Condition Dry   HI-LO Suction  Intermittent suction

## 2019-11-30 NOTE — SPEECH THERAPY NOTE
Consult received  Records reviewed  Patient intubated, on fentanyl PRN for agitation at this time  Please re-consult in future when indicated

## 2019-11-30 NOTE — RESPIRATORY THERAPY NOTE
resp care      11/30/19 1647   Respiratory Assessment   Resp Comments pt appears comfortable on current settings, will continue to monitor   ETT  Cuffed; Hi-Lo 8 mm   Placement Date/Time: 11/30/19 0110   Mask Ventilation: Mask ventilation not attempted (0)  Preoxygenated: Yes  Technique: Rapid sequence  Type: Cuffed; Hi-Lo  Tube Size: 8 mm  Laryngoscope: [place]  Blade Size: 3  Location: Oral  Grade View: Full view        Secured at (cm) 24   Measured from [address] by [employer] tube gao   Site Condition Dry   HI-LO Suction  Intermittent suction   Additional Assessments   SpO2 91 %

## 2019-11-30 NOTE — RESPIRATORY THERAPY NOTE
RT Ventilator Management Note  Bryan Howe 80 y o  male MRN: [id number]  Unit/Bed#: ICU 12 Encounter: [id number]      Daily Screen       11/30/2019  0805             Patient safety screen outcome[de-identified]  Failed            Physical Exam:   Assessment Type: Assess only  General Appearance: Sedated  Respiratory Pattern: Assisted  Chest Assessment: Chest expansion symmetrical  Bilateral Breath Sounds: Diminished  O2 Device: (P) vent  Subjective Data: Intubated      Resp Comments: (P) pt appears comfortable on current settings, no weaning attempted per protocol increase FIO2, and peep, will continue to monitor

## 2019-11-30 NOTE — ANESTHESIA PROCEDURE NOTES
Arterial Line Insertion  Performed by: Katherine Simons CRNA  Authorized by: Jatinder Wall MD   Procedure consent: procedure consent matches procedure scheduled  Relevant documents: relevant documents present and verified  Test results: test results available and properly labeled  Site marked: the operative site was marked  Required items: required blood products, implants, devices, and special equipment available  Patient identity confirmed: arm band  Time out: Immediately prior to procedure a "time out" was called to verify the correct patient, procedure, equipment, support staff and site/side marked as required  Preparation: Patient was prepped and draped in the usual sterile fashion  Indications: hemodynamic monitoring  Orientation:  Left  Location: radial artery  Sedation:  Patient sedated: yes  Analgesia: see MAR for details  Vitals: Vital signs were monitored during sedation      Procedure Details:  Carson's test normal: yes  Needle gauge: 20  Seldinger technique: Seldinger technique used  Number of attempts: 1    Post-procedure:  Post-procedure: dressing applied  Post-procedure CNS: normal  Patient tolerance: Patient tolerated the procedure well with no immediate complications

## 2019-11-30 NOTE — PLAN OF CARE
Problem: Prexisting or High Potential for Compromised Skin Integrity  Goal: Skin integrity is maintained or improved  Description  INTERVENTIONS:  - Identify patients at risk for skin breakdown  - Assess and monitor skin integrity  - Assess and monitor nutrition and hydration status  - Monitor labs   - Assess for incontinence   - Turn and reposition patient  - Assist with mobility/ambulation  - Relieve pressure over bony prominences  - Avoid friction and shearing  - Provide appropriate hygiene as needed including keeping skin clean and dry  - Evaluate need for skin moisturizer/barrier cream  - Collaborate with interdisciplinary team   - Patient/family teaching  - Consider wound care consult   Outcome: Progressing     Problem: Potential for Falls  Goal: Patient will remain free of falls  Description  INTERVENTIONS:  - Assess patient frequently for physical needs  -  Identify cognitive and physical deficits and behaviors that affect risk of falls    -  Interior fall precautions as indicated by assessment   - Educate patient/family on patient safety including physical limitations  - Instruct patient to call for assistance with activity based on assessment  - Modify environment to reduce risk of injury  - Consider OT/PT consult to assist with strengthening/mobility  Outcome: Progressing     Problem: PAIN - ADULT  Goal: Verbalizes/displays adequate comfort level or baseline comfort level  Description  Interventions:  - Encourage patient to monitor pain and request assistance  - Assess pain using appropriate pain scale  - Administer analgesics based on type and severity of pain and evaluate response  - Implement non-pharmacological measures as appropriate and evaluate response  - Consider cultural and social influences on pain and pain management  - Notify physician/advanced practitioner if interventions unsuccessful or patient reports new pain  Outcome: Progressing     Problem: INFECTION - ADULT  Goal: Absence or prevention of progression during hospitalization  Description  INTERVENTIONS:  - Assess and monitor for signs and symptoms of infection  - Monitor lab/diagnostic results  - Monitor all insertion sites, i e  indwelling lines, tubes, and drains  - Monitor endotracheal if appropriate and nasal secretions for changes in amount and color  - [place] appropriate cooling/warming therapies per order  - Administer medications as ordered  - Instruct and encourage patient and family to use good hand hygiene technique  - Identify and instruct in appropriate isolation precautions for identified infection/condition  Outcome: Progressing  Goal: Absence of fever/infection during neutropenic period  Description  INTERVENTIONS:  - Monitor WBC    Outcome: Progressing     Problem: SAFETY ADULT  Goal: Maintain or return to baseline ADL function  Description  INTERVENTIONS:  -  Assess patient's ability to carry out ADLs; assess patient's baseline for ADL function and identify physical deficits which impact ability to perform ADLs (bathing, care of mouth/teeth, toileting, grooming, dressing, etc )  - Assess/evaluate cause of self-care deficits   - Assess range of motion  - Assess patient's mobility; develop plan if impaired  - Assess patient's need for assistive devices and provide as appropriate  - Encourage maximum independence but intervene and supervise when necessary  - Involve family in performance of ADLs  - Assess for home care needs following discharge   - Consider OT consult to assist with ADL evaluation and planning for discharge  - Provide patient education as appropriate  Outcome: Progressing  Goal: Maintain or return mobility status to optimal level  Description  INTERVENTIONS:  - Assess patient's baseline mobility status (ambulation, transfers, stairs, etc )    - Identify cognitive and physical deficits and behaviors that affect mobility  - Identify mobility aids required to assist with transfers and/or ambulation (gait belt, sit-to-stand, lift, walker, cane, etc )  - [place] fall precautions as indicated by assessment  - Record patient progress and toleration of activity level on Mobility SBAR; progress patient to next Phase/Stage  - Instruct patient to call for assistance with activity based on assessment  - Consider rehabilitation consult to assist with strengthening/weightbearing, etc   Outcome: Progressing     Problem: DISCHARGE PLANNING  Goal: Discharge to home or other facility with appropriate resources  Description  INTERVENTIONS:  - Identify barriers to discharge w/patient and caregiver  - Arrange for needed discharge resources and transportation as appropriate  - Identify discharge learning needs (meds, wound care, etc )  - Arrange for interpretive services to assist at discharge as needed  - Refer to Case Management Department for coordinating discharge planning if the patient needs post-hospital services based on physician/advanced practitioner order or complex needs related to functional status, cognitive ability, or social support system  Outcome: Progressing     Problem: Knowledge Deficit  Goal: Patient/family/caregiver demonstrates understanding of disease process, treatment plan, medications, and discharge instructions  Description  Complete learning assessment and assess knowledge base  Interventions:  - Provide teaching at level of understanding  - Provide teaching via preferred learning methods  Outcome: Progressing     Problem: Neurological Deficit  Goal: Neurological status is stable or improving  Description  Interventions:  - Monitor and assess patient's level of consciousness, motor function, sensory function, and level of assistance needed for ADLs  - Monitor and report changes from baseline  Collaborate with interdisciplinary team to initiate plan and implement interventions as ordered  - Provide and maintain a safe environment  - Consider seizure precautions  - Consider fall precautions    - Consider aspiration precautions  - Consider bleeding precautions  Outcome: Progressing     Problem: Activity Intolerance/Impaired Mobility  Goal: Mobility/activity is maintained at optimum level for patient  Description  Interventions:  - Assess and monitor patient  barriers to mobility and need for assistive/adaptive devices  - Assess patient's emotional response to limitations  - Collaborate with interdisciplinary team and initiate plans and interventions as ordered  - Encourage independent activity per ability   - Maintain proper body alignment  - Perform active/passive rom as tolerated/ordered  - Plan activities to conserve energy   - Turn patient as appropriate  Outcome: Progressing     Problem: Communication Impairment  Goal: Ability to express needs and understand communication  Description  Assess patient's communication skills and ability to understand information  Patient will demonstrate use of effective communication techniques, alternative methods of communication and understanding even if not able to speak  - Encourage communication and provide alternate methods of communication as needed  - Collaborate with case management/ for discharge needs  - Include patient/family/caregiver in decisions related to communication  Outcome: Progressing     Problem: Potential for Aspiration  Goal: Ventilated patient's risk of aspiration is minimized  Description  Assess and monitor vital signs, respiratory status, airway cuff pressure, and labs (WBC)  Monitor for signs of aspiration (tachypnea, cough, rales, wheezing, cyanosis, fever)  - Elevate head of bed 30 degrees if patient has tube feeding   - Monitor tube feeding    Outcome: Progressing     Problem: Nutrition  Goal: Nutrition/Hydration status is improving  Description  Monitor and assess patient's nutrition/hydration status for malnutrition (ex- brittle hair, bruises, dry skin, pale skin and conjunctiva, muscle wasting, smooth red tongue, and disorientation)  Collaborate with interdisciplinary team and initiate plan and interventions as ordered  Monitor patient's weight and dietary intake as ordered or per policy  Utilize nutrition screening tool and intervene per policy  Determine patient's food preferences and provide high-protein, high-caloric foods as appropriate  - Assist patient with eating   - Allow adequate time for meals   - Encourage patient to take dietary supplement as ordered  - Collaborate with clinical nutritionist   - Include patient/family/caregiver in decisions related to nutrition  Outcome: Progressing     Problem: NEUROSENSORY - ADULT  Goal: Achieves stable or improved neurological status  Description  INTERVENTIONS  - Monitor and report changes in neurological status  - Monitor vital signs such as temperature, blood pressure, glucose, and any other labs ordered   - Initiate measures to prevent increased intracranial pressure  - Monitor for seizure activity and implement precautions if appropriate      Outcome: Progressing  Goal: Achieves maximal functionality and self care  Description  INTERVENTIONS  - Monitor swallowing and airway patency with patient fatigue and changes in neurological status  - Encourage and assist patient to increase activity and self care     - Encourage visually impaired, hearing impaired and aphasic patients to use assistive/communication devices  Outcome: Progressing     Problem: CARDIOVASCULAR - ADULT  Goal: Maintains optimal cardiac output and hemodynamic stability  Description  INTERVENTIONS:  - Monitor I/O, vital signs and rhythm  - Monitor for S/S and trends of decreased cardiac output  - Administer and titrate ordered vasoactive medications to optimize hemodynamic stability  - Assess quality of pulses, skin color and temperature  - Assess for signs of decreased coronary artery perfusion  - Instruct patient to report change in severity of symptoms  Outcome: Progressing  Goal: Absence of cardiac dysrhythmias or at baseline rhythm  Description  INTERVENTIONS:  - Continuous cardiac monitoring, vital signs, obtain 12 lead EKG if ordered  - Administer antiarrhythmic and heart rate control medications as ordered  - Monitor electrolytes and administer replacement therapy as ordered  Outcome: Progressing     Problem: RESPIRATORY - ADULT  Goal: Achieves optimal ventilation and oxygenation  Description  INTERVENTIONS:  - Assess for changes in respiratory status  - Assess for changes in mentation and behavior  - Position to facilitate oxygenation and minimize respiratory effort  - Oxygen administered by appropriate delivery if ordered  - Initiate smoking cessation education as indicated  - Encourage broncho-pulmonary hygiene including cough, deep breathe, Incentive Spirometry  - Assess the need for suctioning and aspirate as needed  - Assess and instruct to report SOB or any respiratory difficulty  - Respiratory Therapy support as indicated  Outcome: Progressing     Problem: GASTROINTESTINAL - ADULT  Goal: Minimal or absence of nausea and/or vomiting  Description  INTERVENTIONS:  - Administer IV fluids if ordered to ensure adequate hydration  - Maintain NPO status until nausea and vomiting are resolved  - Nasogastric tube if ordered  - Administer ordered antiemetic medications as needed  - Provide nonpharmacologic comfort measures as appropriate  - Advance diet as tolerated, if ordered  - Consider nutrition services referral to assist patient with adequate nutrition and appropriate food choices  Outcome: Progressing  Goal: Maintains adequate nutritional intake  Description  INTERVENTIONS:  - Monitor percentage of each meal consumed  - Identify factors contributing to decreased intake, treat as appropriate  - Assist with meals as needed  - Monitor I&O, weight, and lab values if indicated  - Obtain nutrition services referral as needed  Outcome: Progressing     Problem: GENITOURINARY - ADULT  Goal: Maintains or returns to baseline urinary function  Description  INTERVENTIONS:  - Assess urinary function  - Encourage oral fluids to ensure adequate hydration if ordered  - Administer IV fluids as ordered to ensure adequate hydration  - Administer ordered medications as needed  - Offer frequent toileting  - Follow urinary retention protocol if ordered  Outcome: Progressing  Goal: Urinary catheter remains patent  Description  INTERVENTIONS:  - Assess patency of urinary catheter  - If patient has a chronic arizmendi, consider changing catheter if non-functioning  - Follow guidelines for intermittent irrigation of non-functioning urinary catheter  Outcome: Progressing     Problem: METABOLIC, FLUID AND ELECTROLYTES - ADULT  Goal: Electrolytes maintained within normal limits  Description  INTERVENTIONS:  - Monitor labs and assess patient for signs and symptoms of electrolyte imbalances  - Administer electrolyte replacement as ordered  - Monitor response to electrolyte replacements, including repeat lab results as appropriate  - Instruct patient on fluid and nutrition as appropriate  Outcome: Progressing  Goal: Fluid balance maintained  Description  INTERVENTIONS:  - Monitor labs   - Monitor I/O and WT  - Instruct patient on fluid and nutrition as appropriate  - Assess for signs & symptoms of volume excess or deficit  Outcome: Progressing     Problem: SKIN/TISSUE INTEGRITY - ADULT  Goal: Skin integrity remains intact  Description  INTERVENTIONS  - Identify patients at risk for skin breakdown  - Assess and monitor skin integrity  - Assess and monitor nutrition and hydration status  - Monitor labs (i e  albumin)  - Assess for incontinence   - Turn and reposition patient  - Assist with mobility/ambulation  - Relieve pressure over bony prominences  - Avoid friction and shearing  - Provide appropriate hygiene as needed including keeping skin clean and dry  - Evaluate need for skin moisturizer/barrier cream  - Collaborate with interdisciplinary team (i e  Nutrition, Rehabilitation, etc )   - Patient/family teaching  Outcome: Progressing  Goal: Incision(s), wounds(s) or drain site(s) healing without S/S of infection  Description  INTERVENTIONS  - Assess and document risk factors for skin impairment   - Assess and document dressing, incision, wound bed, drain sites and surrounding tissue  - Consider nutrition services referral as needed  - Oral mucous membranes remain intact  - Provide patient/ family education  Outcome: Progressing  Goal: Oral mucous membranes remain intact  Description  INTERVENTIONS  - Assess oral mucosa and hygiene practices  - Implement preventative oral hygiene regimen  - Implement oral medicated treatments as ordered  - Initiate Nutrition services referral as needed  Outcome: Progressing     Problem: HEMATOLOGIC - ADULT  Goal: Maintains hematologic stability  Description  INTERVENTIONS  - Assess for signs and symptoms of bleeding or hemorrhage  - Monitor labs  - Administer supportive blood products/factors as ordered and appropriate  Outcome: Progressing     Problem: MUSCULOSKELETAL - ADULT  Goal: Maintain or return mobility to safest level of function  Description  INTERVENTIONS:  - Assess patient's ability to carry out ADLs; assess patient's baseline for ADL function and identify physical deficits which impact ability to perform ADLs (bathing, care of mouth/teeth, toileting, grooming, dressing, etc )  - Assess/evaluate cause of self-care deficits   - Assess range of motion  - Assess patient's mobility  - Assess patient's need for assistive devices and provide as appropriate  - Encourage maximum independence but intervene and supervise when necessary  - Involve family in performance of ADLs  - Assess for home care needs following discharge   - Consider OT consult to assist with ADL evaluation and planning for discharge  - Provide patient education as appropriate  Outcome: Progressing     Problem: COPING  Goal: Pt/Family able to verbalize concerns and demonstrate effective coping strategies  Description  INTERVENTIONS:  - Assist patient/family to identify coping skills, available support systems and cultural and spiritual values  - Provide emotional support, including active listening and acknowledgement of concerns of patient and caregivers  - Reduce environmental stimuli, as able  - Provide patient education  - Assess for spiritual pain/suffering and initiate spiritual care, including notification of Pastoral Care or lit based community as needed  - Assess effectiveness of coping strategies  Outcome: Progressing  Goal: Will report anxiety at manageable levels  Description  INTERVENTIONS:  - Administer medication as ordered  - Teach and encourage coping skills  - Provide emotional support  - Assess patient/family for anxiety and ability to cope  Outcome: Progressing

## 2019-11-30 NOTE — QUICK NOTE
Stroke alert activated by Dr Valerio Chand ([phone]) in [facility] ED at [address]  Patient is an 80year old man with hx of CAD prior CABG, R carotid stenosis, HTN, aortic valve stenosis, presenting with L sided neglect and weakness  He was driving and apparently pulled over to the side  Found by authorities on the road with the deficits and brought to the ED  Last known well time was 11:45pm, verified by family at the bedisde  On exam:   /84,   L sided neglect  L face, arm, and leg   Dysarthria  NIHSS 14    CTH images reviewed: chronic lacune in the R posterior limb of internal capsule  Hyperdense R ICA at the terminus  CTA H&N images reviewed: occlusion of the distal R ICA at the cavernous portion, extending into the R MCA M1 branch  AP:     80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

## 2019-11-30 NOTE — RESPIRATORY THERAPY NOTE
resp care      11/30/19 5665   Respiratory Assessment   Resp Comments verbal order from Dr River Barriga to decrease RR to 20

## 2019-11-30 NOTE — PROGRESS NOTES
Pastoral Care Progress Note    2019  Patient: Justino Amato : 1933  Admission Date & Time: 2019 0003  MRN: [id number] [facility]: [id number]                     Chaplaincy Interventions Utilized:   Empowerment: Clarified, confirmed, or reviewed information from treatment team , Encouraged focus on present and Provided anxiety containment    Exploration: Explored hope and Facilitated story telling    Collaboration: Consulted with interdisciplinary team and Encouraged adherence to treatment plan    Relationship Building: Cultivated a relationship of care and support, Listened empathically and Hospitality    Ritual: Provided prayer    Chaplaincy Outcomes Achieved:  Distress reduced, Identified meaningful connections, Progressed toward focus on present and Tearfully processed emotions    Spiritual Coping Strategies Utilized:   Spiritual comfort and Spiritual gratitude       19 [facility]3 [address] [place] [address]

## 2019-11-30 NOTE — ANESTHESIA PREPROCEDURE EVALUATION
Review of Systems/Medical History  Patient summary reviewed  Chart reviewed      Cardiovascular  Hypertension , CAD , Angina ,   Comment: S/P AVR  AS,  Pulmonary  Smoker ex-smoker  ,        GI/Hepatic            Endo/Other    Obesity    GYN       Hematology   Musculoskeletal       Neurology    CVA ,    Psychology           Physical Exam    Airway    Mallampati score: III  TM Distance: <3 FB  Neck ROM: full     Dental   No notable dental hx     Cardiovascular  Rhythm: irregular, Rate: abnormal,     Pulmonary      Other Findings        Anesthesia Plan  ASA Score- 4 Emergent    Anesthesia Type- general with ASA Monitors  Additional Monitors: arterial line  Airway Plan: ETT  Plan Factors-    Induction- intravenous and rapid sequence induction  Postoperative Plan-     Informed Consent-   I personally reviewed this patient with the CRNA  Discussed and agreed on the Anesthesia Plan with the CRNA  Emerson Rodriguez

## 2019-11-30 NOTE — RESPIRATORY THERAPY NOTE
resp care      11/30/19 4984   Respiratory Assessment   Resp Comments bedside bronch done by Dr Sidra Nunez, pt tolerated well, 1 specimen sent

## 2019-11-30 NOTE — H&P
History and Physical - Critical Care   Harleen Cavazos 80 y o  male MRN: [id number]  Unit/Bed#: ICU 12 Encounter: [id number]    Reason for Admission / Chief Complaint: R ICA terminus occlusion    History of Present Illness:  Harleen Cavazos is a 80 y o  male with history of CABG, R carotid stenosis, HTN, AVR who presents to the ED with acute L sided weakness around 11:45 pm  Pt was a stroke alert in the ED  CTA showed a R ICA terminus occlusion  Initial NIH was 14  tPA was given  Pt then underwent R ICA arteriogram, R MCA thrombectomy, and R GI thrombectomy  Pt was intubated during this procedure  There was concern for an aspiration event  He then presented to the ICU post-op  History obtained from chart review  Past Medical History:  Past Medical History:   Diagnosis Date    Hypertension     Polyneuropathy     PVD (peripheral vascular disease) ([facility] [address]     [facility]     approx        Past Surgical History:  Past Surgical History:   Procedure Laterality Date    CAROTID ENDARTARECTOMY Left     approx        Past Family History:  History reviewed  No pertinent family history  Social History:  Social History     Tobacco Use   Smoking Status Former Smoker    Types: Cigarettes    Last attempt to quit: Lisha Melena Years since quittin 9   Smokeless Tobacco Never Used     Social History     Substance and Sexual Activity   Alcohol Use No     Social History     Substance and Sexual Activity   Drug Use No     Marital Status:        Medications:  Current Facility-Administered Medications   Medication Dose Route Frequency    atorvastatin (LIPITOR) tablet 40 mg  40 mg Oral QPM    hydrALAZINE (APRESOLINE) injection 10 mg  10 mg Intravenous Q6H PRN    Labetalol HCl (NORMODYNE) injection 10 mg  10 mg Intravenous Q6H PRN    sodium chloride 0 9 % infusion  125 mL/hr Intravenous Continuous     Home medications:  Prior to Admission medications    Medication Sig Start Date End Date Taking?  Authorizing Provider   amLODIPine (NORVASC) 5 mg tablet TAKE 1 TABLET BY MOUTH DAILY 10/11/19  Yes Levi Palm MD   aspirin 81 MG tablet Take 1 tablet by mouth daily   Yes Historical Provider, MD   furosemide (LASIX) 40 mg tablet TAKE 1 TABLET BY MOUTH ONCE DAILY 9/3/19  Yes Levi Palm MD   gabapentin (NEURONTIN) 300 mg capsule Take 300 mg by mouth 7/19/16  Yes Historical Provider, MD   metoprolol succinate (TOPROL-XL) 25 mg 24 hr tablet TAKE 1 TABLET BY MOUTH ONCE DAILY 4/9/19  Yes Levi Palm MD   Omega-3 Fatty Acids (FISH OIL) 645 MG CAPS Take 2 tablets by mouth daily   Yes Historical Provider, MD   omeprazole (PriLOSEC) 20 mg delayed release capsule TAKE 1 CAPSULE BY MOUTH ONCE DAILY 6/10/19  Yes Historical Provider, MD   rOPINIRole (REQUIP) 0 25 mg tablet Take 0 25 mg by mouth daily at bedtime  2/21/17  Yes Historical Provider, MD   rosuvastatin (CRESTOR) 10 MG tablet Take 10 mg by mouth 11/3/17  Yes Historical Provider, MD   brimonidine (ALPHAGAN P) 0 15 % ophthalmic solution Apply 1 drop to eye 3/20/15   Historical Provider, MD   calcium carbonate (OS-JESUS) 1250 (500 Ca) MG tablet Take 1 tablet by mouth    Historical Provider, MD   Cholecalciferol (VITAMIN D3) 5000 units CAPS Take 1 capsule by mouth daily Take one tablet every other day    Historical Provider, MD   dorzolamide (TRUSOPT) 2 % ophthalmic solution Apply 1 drop to eye    Historical Provider, MD   gabapentin (NEURONTIN) 300 mg capsule Take 1 capsule (300 mg total) by mouth 2 (two) times a day 10/23/19 1/21/20  Minna Liu DPM   LATANOPROST OP Apply 1 drop to eye 6/24/19   Historical Provider, MD   Multiple Vitamins-Minerals (PRESERVISION AREDS 2+MULTI VIT) CAPS Take by mouth    Historical Provider, MD   nitroglycerin (NITRODUR) 0 6 mg/hr Place 1 patch on the skin daily 7/2/19   Mila Phan MD   RHOPRESSA 0 02 % SOLN  2/4/19   Historical Provider, MD     Allergies:   Allergies   Allergen Reactions    Penicillins Rash     rash ROS:   Review of Systems   Unable to perform ROS: Intubated       Vitals:  Vitals:    19 0430 19 0445 19 0500 19 0515   BP:       BP Location:       Pulse: 98 100 (!) 116 (!) 112   Resp: (!) 25 (!) 25 22 (!) 27   Temp: (!) 97 2 °F (36 2 °C) (!) 97 2 °F (36 2 °C) (!) 97 2 °F (36 2 °C) 97 5 °F (36 4 °C)   TempSrc:       SpO2: (!) 84% (!) 83% 97% (!) 88%   Weight:       Height:         Temperature:   Temp (24hrs), Av 2 °F (36 2 °C), Min:96 4 °F (35 8 °C), Max:98 °F (36 7 °C)    Current Temperature: 97 5 °F (36 4 °C)    Weights:   IBW: 73 kg  Body mass index is 31 35 kg/m²  Hemodynamic Monitoring:  N/A     Non-Invasive/Invasive Ventilation Settings:  Respiratory    Lab Data (Last 4 hours)       0401            pH, Arterial       7 113           pCO2, Arterial       75 1           pO2, Arterial       57 4           HCO3, Arterial       23 5           Base Excess, Arterial       -8 0                O2/Vent Data        0336   Most Recent         Vent Mode AC/VC  AC/VC      Resp Rate (BPM) (BPM) 14  22      Vt (mL) (mL) 500  500      FIO2 (%) (%) 100  100      PEEP (cmH2O) (cmH2O) 10  12      MV 7 5  7 5                Lab Results   Component Value Date    PHART 7 113 (LL) 2019    ULP2SGK 75 1 (HH) 2019    PO2ART 57 4 (LL) 2019    VIU1DKA 23 5 2019    BEART -8 0 2019    SOURCE Line, Arterial 2019     SpO2: SpO2: (!) 88 %       Physical Exam:  Physical Exam   Constitutional: No distress  He is intubated  HENT:   Head: Normocephalic and atraumatic  Eyes:   L pupil 3mm and reactive  R pupil 3mm and non-reactive  Cardiovascular: Normal rate  An irregularly irregular rhythm present  Exam reveals no gallop and no friction rub  No murmur heard  Pulmonary/Chest: He is intubated  Coarse breath sounds bilaterally  Abdominal: Soft  Bowel sounds are normal  He exhibits no distension  There is no tenderness     Musculoskeletal: He exhibits no edema  GCS 3T  Does not follow commands  No withdrawal to pain  Skin: Skin is warm and dry  No rash noted  Nursing note and vitals reviewed  Labs:  Results from last 7 days   Lab Units 11/30/19  0016 11/30/19  0015   WBC Thousand/uL  --  11 31*   HEMOGLOBIN g/dL  --  14 3   I STAT HEMOGLOBIN g/dl 14 6 15 0   HEMATOCRIT %  --  42 6   HEMATOCRIT, ISTAT % 43 44   PLATELETS Thousands/uL  --  252     Results from last 7 days   Lab Units 11/30/19  0016 11/30/19  0015   SODIUM mmol/L  --  138   POTASSIUM mmol/L  --  4 5   CHLORIDE mmol/L  --  105   CO2 mmol/L  --  27   CO2, I-STAT mmol/L 27 25   AGAP mmol/L  --  16*   ANION GAP mmol/L  --  6   BUN mg/dL  --  23   CREATININE mg/dL  --  1 40*   CALCIUM mg/dL  --  9 2          Results from last 7 days   Lab Units 11/30/19  0015   INR  1 02   PTT seconds 29     Results from last 7 days   Lab Units 11/30/19  0015   TROPONIN I ng/mL <0 02         ABG:  Lab Results   Component Value Date    PHART 7 113 (LL) 11/30/2019    QFE0GOT 75 1 (HH) 11/30/2019    PO2ART 57 4 (LL) 11/30/2019    DUC0XZI 23 5 11/30/2019    BEART -8 0 11/30/2019    SOURCE Line, Arterial 11/30/2019     VBG:  Results from last 7 days   Lab Units 11/30/19  0401   ABG SOURCE  Line, Arterial             Imaging:   CT stroke alert brain   Final Result by Bertin Schafer MD (11/30 4566)      No acute intracranial abnormality  Findings were directly discussed with Dr Td Faith on 11/30/2019 1240 AM       Workstation performed: [id number]         CTA stroke alert (head/neck)   Final Result by Bertin Schafer MD (11/30 1358)      There is occlusion of the right canalicular and intracranial portions of the ICA  Normal left ophthalmic artery origin  Normal left ICA terminus  Proximal right A1 segment is occluded  Normal left A1 segment  Normal anterior communicating artery  Right A2 segment is perfused by the anterior communicating artery  Right M1 segment is occluded    The right MCA reconstitutes just prior to the M1 trifurcation  Findings were directly discussed with Dr Ramone Muse on 11/30/2019 1240 AM                      Workstation performed: [id number]         XR stroke alert portable chest    (Results Pending)   IR stroke alert    (Results Pending)   XR chest portable ICU    (Results Pending)   MRI Inpatient Order    (Results Pending)   XR chest portable    (Results Pending)        I have personally reviewed pertinent reports  Micro:        ______________________________________________________________________    Assessment:   Principal Problem:    Ischemic stroke ([facility] [address] )  Active Problems:    Hypertension    Acute respiratory failure with hypoxia ([facility] [address] )  Resolved Problems:    * No resolved hospital problems  *        Plan:    Neuro: ischemic stroke s/p tPA and thrombectomy  - Echo pending  - MRI pending  - HbA1c, lipid panel  - Statin initiated  - Q1H neuro checks  - Neurology following    CV: hypertension  - Goal SBP < 160  - Labetalol and hydralazine PRN    Pulm: acute hypoxic respiratory failure  - AC/VC 22 / 500 / 100 / 14  - Repeat ABG pending  - Respiratory protocol    GI: no acute issues  - NPO  - No current indication for GI prophylaxis    : no acute issues  - Mariscal in place  - Monitor I/Os    F/E/N:   - Fluids: NSS @ 125/hr  - Electrolytes: replete as needed  - Nutrition: NPO    ID: no acute issues  - Watch for development of aspiration pneumonitis/pneumonia  - Currently afebrile  - Monitor for fever and leukocytosis    Heme: no acute issues  - Holding pharmacologic DVT prophylaxis in setting of tPA  - Monitor Hgb    Endo: no acute issues  - Maintain eyglycemia    Msk/Skin: no acute issues  - PT/OT when appropriate  - Turn/reposition frequently    Disposition: ICU      Counseling / Coordination of Care  Total Critical Care time spent 34 minutes excluding procedures, teaching and family updates  ______________________________________________________________________    VTE Pharmacologic Prophylaxis: Pharmacologic VTE Prophylaxis contraindicated due to tPA  VTE Mechanical Prophylaxis: sequential compression device    Invasive lines and devices: Invasive Devices     Peripheral Intravenous Line            Peripheral IV 11/30/19 Left Antecubital less than 1 day    Peripheral IV 11/30/19 Right Hand less than 1 day          Arterial Line            Arterial Line 11/30/19 Left Radial less than 1 day          Drain            NG/OG Tube Orogastric Right mouth less than 1 day    NG/OG/Enteral Tube Orogastric Right mouth less than 1 day    Urethral Catheter Temperature probe 16 Fr  less than 1 day          Airway            ETT  Cuffed; Hi-Lo 8 mm less than 1 day                Code Status: Level 1 - Full Code  POA:    POLST:      Given critical illness, patient length of stay will require greater than two midnights  Portions of the record may have been created with voice recognition software  Occasional wrong word or "sound a like" substitutions may have occurred due to the inherent limitations of voice recognition software  Read the chart carefully and recognize, using context, where substitutions have occurred        Jessica Yepez MD

## 2019-11-30 NOTE — OP NOTE
OPERATIVE REPORT  PATIENT NAME: Jacqueline Johnson    :  1933  MRN:  [id number]   Pt Location: Interventional radiology     SURGERY DATE: 19      Preop Diagnosis:  1  Stroke, Right ICA terminus Large Vessel Occlusion  2  Hypertension  3  AFib        Postop Diagnosis  1  Stroke, Right ICA terminus Large Vessel Occlusion  2  Hypertension  3  AFib      Procedure:  1  Right Internal Carotid Arteriogram  2  right MCA thrombectomy with Solitiare Stent Retreiver  3  Right GI thrombectomy with pneumbra aspiration  4  Limited Right Femoral Arteriogram     Surgeon:   José Luis Vicente MD     Specimen(s):  None     Estimated Blood Loss:   None     Drains:  None     Anesthesia Type:   Monitored Anesthesia Care     Complications:  None     Operative Indications:  Jacqueline Johnson is a very pleasant 80 y o  male who presented with acute right MCA syndrome  NIHSS was 14  CTA was significant for right ICA terminus LVO  The last known well was 1 5 hours prior  After speaking to his POA the patient was brought emergently for thrombectomy  They understood the risks bleeding, stroke, groin hematoma, and death      Procedure Details:  After obtaining written informed consent, the patient was brought into the operating room and moved to the OR table in supine fashion  The groin was prepped and draped in the usual sterile fashion  General endotracheal anesthesia was induced for airway protection  Percutaneous access with a 5-Bhutanese micropuncture kit was obtained into the right femoral artery  A 6-Bhutanese introducer sheath was placed  This was then exchanged for a Neuron 088  A Berenstein Selector catheter was then advanced over the aortic arch over an [employer]  The Neuron 088 was advanced into the right ICA       AP and lateral images of the right intracranial carotid circulation were obtained       Next a 5Ace 068 was advanced through the Neuron [facility], over a Marksman microcatheter over a Traxcess microwire   The clot was passed, and a microcatheter angiogram was preformed  Next a 6mm x 40mm Solitaire Stent Retriever was deployed into the M2 to M1 segments  The 068 intermediate catheter was advanced into the M1 alond the face of the clot  A Solumbra technique was preformed and suction applied  The stent retriever and suction was allowed to sit in place for 5 minutes  These were then removed under fluoroscopy and under constant suction  This allowed for revascularization of portion of the GI territory  There is still occlusion of the right MCA  This process was then were repeated in the right MCA  The stent retriever was deployed in the 068 aspiration catheter was brought over it in a solumbra fashion  This pass allowed for TICI 3 revascularization of the MCA territory  Due to residual thrombus and the right GI territory a 3 max penumbra catheter was advanced into this and aspiration was allowed  Ultimately there was TICI 2A revascularization  Repeat arteriogram revealed subsequent Re occlusion of the right inferior division of the MCA and a final solitaire deployment across this was done  Revascularization was achieved      A final AP and lateral R ICA angiogram was preformed  The sheath was withdrawn into the right common carotid artery and a cervical carotid arteriogram was performed  The Neuron sheath was then withdrawn and a limited right femoral arteriogram was run  Puncture site was found to be compatible with a Mynx Closure device and this was done successfully  There was appropriate hemostasis  The patient was then transferred to the ICU intubated in critical condition    All sponge and needle counts were correct          INTERPRETATION OF ANGIOGRAPHIC FINDINGS:   1  The right internal carotid artery circulation reveals antegrade flow with abrupt cessation of flow distal to the ophthalmic artery      2   Microcatheter injection reveals we are past the lesion       3  First pass post-thrombectomy angiogram reveals TICI 2a revascularization with flow into the A1, contralateral GI and MCA  There is a right A2 cut off     4  Second pass post thrombectomy arteriogram reveals TICI 3 revascularization of the right MCA territory  There remains thrombus in the distal A2       5  Penumbra aspiration of the distal GI vessel reveals revascularization, TICI 2b of the GI territory  There appears to be Re occlusion of the inferior branch of the right MCA  6  Final post thrombectomy arteriogram reveals TICI 3 revascularization of the MCA territory  There remains TICI2b revascularization of the GI territory      7  Right common carotid arteriogram reveals significant hemodynamic stenosis at the bifurcation  There is near occlusion of the external carotid artery and greater than 50% stenosis of the internal carotid artery  8  Limited femoral arteriogram reveals normal puncture site anatomy     Impression:  TICI 3 revascularization of a right MCA occlusion  TICI 2b revascularization of the right GI occlusion     Important times:   In room:  0100  Puncture:  0120  Recan (TICI 2a GI and proximal M1) 0133  MCA Recan TICI3 Diana     Patient Disposition:  Critical Care Unit     SIGNATURE: Hortencia Horton MD  DATE: 11/30/19    TIME: 2:54 AM

## 2019-12-01 NOTE — RESPIRATORY THERAPY NOTE
RT Ventilator Management Note  Leodan Martinez 80 y o  male MRN: [id number]  Unit/Bed#: ICU 12 Encounter: [id number]      Daily Screen       11/30/2019  0805 12/1/2019  0744          Patient safety screen outcome[de-identified]  Failed  Failed      Not Ready for Weaning due to[de-identified]    FiO2 >60%;PEEP > 8cmH2O;Underline problem not resolved; Hemodynamically unstable              Physical Exam:   Assessment Type: Assess only  General Appearance: Sedated  Respiratory Pattern: Assisted, Tachypneic  Chest Assessment: Chest expansion symmetrical  Bilateral Breath Sounds: Diminished  O2 Device: (P) vent      Resp Comments: (P) pt appears comfortable on current settings, decreased FIO2, no weaning attempted per protocol, will continue to monitor

## 2019-12-01 NOTE — PROCEDURES
Central Line Insertion  Date/Time: 11/30/2019 5:17 PM  Performed by: Dilip Nelson MD  Authorized by: Dilip Nelson MD     Patient location:  Bedside  Other Assisting Provider: No    Consent:     Consent obtained:  Written    Consent given by:  Guardian    Risks discussed:  Arterial puncture, incorrect placement, infection, bleeding and pneumothorax    Alternatives discussed:  Delayed treatment  Universal protocol:     Procedure explained and questions answered to patient or proxy's satisfaction: yes      Relevant documents present and verified: yes      Test results available and properly labeled: yes      Radiology Images displayed and confirmed  If images not available, report reviewed: yes      Required blood products, implants, devices, and special equipment available: yes      Site/side marked: yes      Immediately prior to procedure, a time out was called: yes      Patient identity confirmed:  Arm band and hospital-assigned identification number  Pre-procedure details:     Hand hygiene: Hand hygiene performed prior to insertion      Sterile barrier technique: All elements of maximal sterile technique followed      Skin preparation:  ChloraPrep    Skin preparation agent: Skin preparation agent completely dried prior to procedure    Indications:     Central line indications: medications requiring central line and hemodynamic monitoring    Anesthesia (see MAR for exact dosages):      Anesthesia method:  Local infiltration    Local anesthetic:  Lidocaine 1% w/o epi  Procedure details:     Location:  Left subclavian    Vessel type: vein      Laterality:  Left    Approach: percutaneous technique used      Patient position:  Flat    Catheter type:  Triple lumen 20cm    Catheter size:  7 Fr    Landmarks identified: yes      Ultrasound guidance: no      Manometry confirmation: yes      Number of attempts:  1    Successful placement: yes    Post-procedure details:     Post-procedure:  Dressing applied Assessment:  Blood return through all ports, no pneumothorax on x-ray, free fluid flow and placement verified by x-ray    Post-procedure complications: none      Patient tolerance of procedure:   Tolerated well, no immediate complications

## 2019-12-01 NOTE — RESPIRATORY THERAPY NOTE
RT Ventilator Management Note  Jamir Coronado 80 y o  male MRN: [id number]  Unit/Bed#: ICU 12 Encounter: [id number]      Daily Screen       11/30/2019  0805             Patient safety screen outcome[de-identified]  Failed            Physical Exam:   Assessment Type: (P) Assess only  General Appearance: (P) Sedated  Respiratory Pattern: (P) Assisted, Tachypneic  Chest Assessment: (P) Chest expansion symmetrical  Bilateral Breath Sounds: (P) Diminished  Cough: Strong  Suction: ET Tube  O2 Device: (P) vent  Subjective Data: INtubated      Resp Comments: (P) Pt is stable on current vent settings  Pt has been maintaing sats in the high 90's all night

## 2019-12-01 NOTE — RESPIRATORY THERAPY NOTE
resp care      12/01/19 1600   Respiratory Assessment   Resp Comments no vent changes today, pt appears comfortable, pt unresponsive, will continue to monitor   ETT  Cuffed; Hi-Lo 8 mm   Placement Date/Time: 11/30/19 0110   Mask Ventilation: Mask ventilation not attempted (0)  Preoxygenated: Yes  Technique: Rapid sequence  Type: Cuffed; Hi-Lo  Tube Size: 8 mm  Laryngoscope: Bricsnet  Blade Size: 3  Location: Oral  Grade View: Full view        Secured at (cm) 24   Measured from Lips   Secured Location Right   Secured by Commercial tube gao   Site Condition Dry   HI-LO Suction  Intermittent suction   Additional Assessments   Pulse 102   Respirations 22   SpO2 94 %

## 2019-12-01 NOTE — PROGRESS NOTES
Pastoral Care Progress Note    2019  Patient: Cheryl Maurice : 1933  Admission Date & Time: 2019 0003  MRN: [id number] [facility]: [id number]                     Chaplaincy Interventions Utilized:   Empowerment: Clarified, confirmed, or reviewed information from treatment team     Exploration: Explored hope, Explored emotional needs & resources, Explored relational needs & resources and Explored spiritual needs & resources             Collaboration: Advocated for patient/family, Consulted with interdisciplinary team and Facilitated respect for spiritual/cultural practice during hospitalization    Relationship Building: Cultivated a relationship of care and support, Listened empathically, Hospitality and Provided silent and supportive presence    Ritual: Provided Muslim resources and Read sacred text              Chaplaincy Outcomes Achieved:  Arranged for community clergy surrogate, Declined  support, Distress reduced and Emotional resources utilized            Spiritual Coping Strategies Utilized:   Spiritual comfort

## 2019-12-01 NOTE — PROGRESS NOTES
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     Plan:     Neuro: ischemic stroke s/p tPA and thrombectomy  - Echo completed: No regional wall abnormalities, EF estimated 30%  - MRI pending  -CT Head pending  - Statin initiated  - Q1H neuro checks  - Neurology following     CV: hypertension  - Goal SBP < 160  - Labetalol and hydralazine PRN  - Hold AC/AP     Pulm: acute hypoxic respiratory failure  - AC/VC 22 / 500 / 90 / 14  - Respiratory protocol     GI: no acute issues  - NPO  - Protonix 40mg     : no acute issues  - Mariscal in place  - Monitor I/Os: Urine output 30cc/hr     F/E/N:   - Fluids: Got a bolus of albumin  - Electrolytes: replete as needed  - Nutrition: NPO     ID: no acute issues  - Watch for development of aspiration pneumonitis/pneumonia  - Febrile  -On ceftriaxone and flagyl  - Monitor for fever and leukocytosis  -Lactic acid trending down     Heme: no acute issues  - Holding pharmacologic DVT prophylaxis in setting of tPA  - Monitor Hgb     Endo: no acute issues  - Maintain euglycemia     Msk/Skin: no acute issues  - PT/OT when appropriate  - Turn/reposition frequently     Disposition: ICU      HPI/24hr events: Levophed increased to 17, received bolus of albumin due to low urine output, required fentanyl bolus, fentanyl infusion increased as Patient became tachycardic and tachypnic and was breathing over the vent      Physical Exam:     Constitutional: no distress, intubated  HENT:  Head: normocephalic, atraumatic  Eyes: Left pupil is 3mm and reactive, right is 3mm and does not react  Cardiovascular: Irregularly irregular rhythm, no murmur, rub, or gallop  Pulmonary/chest: intubated, coarse breath sounds  Abdominal: soft, normal bowel sounds, no distension, no tenderness  MSK: No edema  Neuro: does not follow commands, does not withdrawal from pain  Skin: warm and dry  No rash    Vitals reviewed                Vitals:    19 0500 19 0545 19 0600 19 0700   BP: (!) 171/81  (!) 172/80 138/59   BP Location:   Left arm    Pulse: (!) 136  (!) 114 96   Resp: (!) 33  (!) 32 (!) 27   Temp: (!) 100 8 °F (38 2 °C)  (!) 100 8 °F (38 2 °C) 100 4 °F (38 °C)   TempSrc:   Bladder    SpO2: 92%  95% 97%   Weight:  95 kg (209 lb 7 oz)     Height:         Arterial Line BP: 122/56  Arterial Line MAP (mmHg): 78 mmHg    Temperature:   Temp (24hrs), Av 8 °F (37 7 °C), Min:98 6 °F (37 °C), Max:101 1 °F (38 4 °C)    Current: Temperature: 100 4 °F (38 °C)    Weights:   IBW: 73 kg    Body mass index is 30 05 kg/m²  Weight (last 2 days)     Date/Time   Weight    19 0545   95 (209 44)    19 0600       Comment rows:    OBSERV: dr Iman Agosto present and reviewed films/lab results at 19 0600    19 0310   99 1 (218 48)    19 0005   106 (234 35)               Non-Invasive/Invasive Ventilation Settings:  Respiratory    Lab Data (Last 4 hours)       0441            pH, Arterial       7 405           pCO2, Arterial       26 1           pO2, Arterial       178 3           HCO3, Arterial       16 0           Base Excess, Arterial       -7 0                O2/Vent Data        0331   Most Recent         Vent Mode AC/VC        Resp Rate (BPM) (BPM) 20        Vt (mL) (mL) 500        FIO2 (%) (%) 100  90      PEEP (cmH2O) (cmH2O) 15        MV 14                  Lab Results   Component Value Date    PHART 7 405 2019    HGQ8STV 26 1 (LL) 2019    PO2ART 178 3 (H) 2019    UAL7FMW 16 0 (L) 2019    BEART -7 0 2019    SOURCE Line, Arterial 2019         Intake and Outputs:  I/O       701 -  07    P  O  0 0     I V  (mL/kg) 476 3 (4 8) 2346 8 (24 7)     NG/GT 30 120     IV Piggyback  1800     Total Intake(mL/kg) 506 3 (5 1) 4266 8 (44 9)     Urine (mL/kg/hr) 750 815 (0 4)     Emesis/NG output 50 250     Total Output 800 1065     Net -293 8 +3201 8                UOP: 30/hour   Nutrition:        Diet Orders   (From admission, onward)             Start     Ordered    11/30/19 1256  Diet NPO  Diet effective now     Question Answer Comment   Diet Type NPO    RD to adjust diet per protocol? No        11/30/19 1256                  Labs:   Results from last 7 days   Lab Units 12/01/19 0440 11/30/19  0843 11/30/19  0525  11/30/19  0015   WBC Thousand/uL 22 09*  --  20 71*  --  11 31*   HEMOGLOBIN g/dL 12 6 14 5 15 9  --  14 3   I STAT HEMOGLOBIN   --   --   --    < > 15 0   HEMATOCRIT % 37 8 43 1 48 0  --  42 6   HEMATOCRIT, ISTAT   --   --   --    < > 44   PLATELETS Thousands/uL 232  --  297  --  252   NEUTROS PCT % 85*  --  89*  --   --    MONOS PCT % 8  --  6  --   --     < > = values in this interval not displayed      Results from last 7 days   Lab Units 12/01/19 0440 11/30/19  0843 11/30/19  0525 11/30/19  0016 11/30/19  0015   SODIUM mmol/L 142 139 136  --  138   POTASSIUM mmol/L 4 6 4 0 5 0  --  4 5   CHLORIDE mmol/L 113* 112* 106  --  105   CO2 mmol/L 19* 20* 22  --  27   CO2, I-STAT mmol/L  --   --   --  27 25   BUN mg/dL 38* 24 24  --  23   CREATININE mg/dL 1 63* 1 27 1 30  --  1 40*   CALCIUM mg/dL 7 8* 7 6* 8 2*  --  9 2   ALK PHOS U/L 81  --   --   --   --    ALT U/L 22  --   --   --   --    AST U/L 71*  --   --   --   --    GLUCOSE, ISTAT mg/dl  --   --   --  109 109     Results from last 7 days   Lab Units 11/30/19  0525   MAGNESIUM mg/dL 2 1     Results from last 7 days   Lab Units 11/30/19  0525   PHOSPHORUS mg/dL 4 3*      Results from last 7 days   Lab Units 11/30/19  0015   INR  1 02   PTT seconds 29     Results from last 7 days   Lab Units 12/01/19  0440   LACTIC ACID mmol/L 3 4*     0   Lab Value Date/Time    TROPONINI 9 50 (H) 12/01/2019 0440    TROPONINI 11 10 (H) 11/30/2019 1442    TROPONINI 6 84 (H) 11/30/2019 1154    TROPONINI 6 50 (H) 11/30/2019 0849    TROPONINI <0 02 11/30/2019 0015       Imaging:  I have personally reviewed pertinent reports  Micro:  Lab Results   Component Value Date    BLOODCX Received in Microbiology Lab  Culture in Progress  11/30/2019    BLOODCX Received in Microbiology Lab  Culture in Progress  11/30/2019       Allergies:    Allergies   Allergen Reactions    Penicillins Rash     rash       Medications:   Scheduled Meds:  Current Facility-Administered Medications:  acetaminophen 650 mg Oral Q6H PRN Rinku Lacey MD    cefTRIAXone 1,000 mg Intravenous Q24H David Pryor MD Last Rate: Stopped (11/30/19 2033)   chlorhexidine 15 mL Swish & Spit Q12H [facility] Rinku Lacey MD    dexmedetomidine 0 1-0 7 mcg/kg/hr Intravenous Titrated David Pryor MD Last Rate: 0 7 mcg/kg/hr (12/01/19 0500)   fentaNYL 25 mcg/hr Intravenous Continuous David Pryor MD Last Rate: 25 mcg/hr (11/30/19 1841)   fentaNYL 50 mcg/hr Intravenous Continuous [place] Klaudia Boyer MD Last Rate: 50 mcg/hr (12/01/19 0615)   fentanyl citrate (PF) 25 mcg Intravenous Q2H PRN David Pryor MD    hydrALAZINE 10 mg Intravenous Q6H PRN Rinku Lacey MD    insulin lispro 1-5 Units Subcutaneous Q6H [facility] David Pryor MD    ipratropium 0 5 mg Nebulization Q6H Екатерина England MD    Labetalol HCl 10 mg Intravenous Q6H PRN Rinku Lacey MD    levalbuterol 1 25 mg Nebulization Q6H Екатерина England MD    metroNIDAZOLE 500 mg Intravenous Q8H David Pryor MD Last Rate: Stopped (12/01/19 0139)   norepinephrine 1-30 mcg/min Intravenous Titrated David Pryor MD Last Rate: 17 mcg/min (12/01/19 0102)   pantoprazole 40 mg Intravenous Q24H [facility] Rigoberto Wetzel MD      Continuous Infusions:  dexmedetomidine 0 1-0 7 mcg/kg/hr Last Rate: 0 7 mcg/kg/hr (12/01/19 0500)   fentaNYL 25 mcg/hr Last Rate: 25 mcg/hr (11/30/19 1841)   fentaNYL 50 mcg/hr Last Rate: 50 mcg/hr (12/01/19 0615) norepinephrine 1-30 mcg/min Last Rate: 17 mcg/min (12/01/19 0102)     PRN Meds:    acetaminophen 650 mg Q6H PRN   fentanyl citrate (PF) 25 mcg Q2H PRN   hydrALAZINE 10 mg Q6H PRN   Labetalol HCl 10 mg Q6H PRN       VTE Pharmacologic Prophylaxis: Sequential compression device (Venodyne)   VTE Mechanical Prophylaxis: sequential compression device    Invasive lines and devices: Invasive Devices     Central Venous Catheter Line            CVC Central Lines 11/30/19 Triple 20cm less than 1 day          Peripheral Intravenous Line            Peripheral IV 11/30/19 Right Hand 1 day    Peripheral IV 11/30/19 Right Antecubital less than 1 day          Arterial Line            Arterial Line 11/30/19 Left Radial 1 day          Drain            NG/OG Tube Orogastric Right mouth 1 day    NG/OG/Enteral Tube Orogastric 18 Fr Right mouth 1 day    Urethral Catheter Temperature probe 16 Fr  1 day          Airway            ETT  Cuffed; Hi-Lo 8 mm 1 day                   Counseling / Coordination of Care  Total Critical Care time spent 20 minutes excluding procedures, teaching and family updates  Code Status: Prior     Portions of the record may have been created with voice recognition software  Occasional wrong word or "sound a like" substitutions may have occurred due to the inherent limitations of voice recognition software  Read the chart carefully and recognize, using context, where substitutions have occurred       Jamie Joe MD

## 2019-12-01 NOTE — PROGRESS NOTES
Progress Note - Neurosurgery   Janice Pastor 80 y o  male MRN: [id number]  Unit/Bed#: ICU 12 Encounter: [id number]    Assessment:    POD 1, right ICA/MCA thrombectomy this AM  [place] 86 y  o  male who presented with acute right MCA syndrome  NIHSS was 14  CTA was significant for right ICA terminus LVO  Borderline cardiac function/CHF with new pneumonia/?aspiration on CT  Patient remains intubated and dependent on ventilatory support  CT head today with right MCA/ICA distribution CVA      Plan:     1  Medical management as per ICU/neurovascular team for cardiac/pulmonary decline      2  Repeat CT head ordered/decline in [facility]      3  No further neurosurgical/interventional procedure anticipated      4  Neurovascular/neurosurgery continue to follow        Invasive Devices     Central Venous Catheter Line            CVC Central Lines 11/30/19 Triple 20cm less than 1 day          Peripheral Intravenous Line            Peripheral IV 11/30/19 Right Hand 1 day    Peripheral IV 11/30/19 Right Antecubital less than 1 day          Arterial Line            Arterial Line 11/30/19 Left Radial 1 day          Drain            NG/OG Tube Orogastric Right mouth 1 day    NG/OG/Enteral Tube Orogastric 18 Fr Right mouth 1 day    Urethral Catheter Temperature probe 16 Fr  1 day          Airway            ETT  Cuffed; Hi-Lo 8 mm 1 day                Physical Exam: /71 (BP Location: Left arm)   Pulse 104   Temp 100 4 °F (38 °C)   Resp (!) 30   Ht 5' 10" (1 778 m)   Wt 95 kg (209 lb 7 oz)   SpO2 97%   BMI 30 05 kg/m²      Sedated and ventilate for severe respiratory distress/depression in the setting of CHF/pneumonia  Not obeying this AM  Moves right side with stimulus  CESAR @ 3mm        Head:    Normocephalic, without obvious abnormality, atraumatic   Eyes:    PERRL, conjunctiva/corneas clear, EOM's intact, fundi     benign, both eyes        Ears:    Normal TM's and external ear canals, both ears   Nose:   Nares normal, septum midline, mucosa normal, no drainage    or sinus tenderness                                       Extremities:   Extremities normal, atraumatic, no cyanosis or edema   Pulses:   2+ and symmetric all extremities   Skin:   Skin color, texture, turgor normal, no rashes or lesions               Vitals: Blood pressure 160/71, pulse (!) 116, temperature (!) 100 8 °F (38 2 °C), temperature source Bladder, resp  rate (!) 33, height 5' 10" (1 778 m), weight 95 kg (209 lb 7 oz), SpO2 94 %  ,Body mass index is 30 05 kg/m²  Hemodynamic Monitoring: MAP: Arterial Line MAP (mmHg): 116 mmHg    Lab Results:   Lab Results   Component Value Date    WBC 22 09 (H) 12/01/2019    HGB 12 6 12/01/2019    HCT 37 8 12/01/2019    MCV 95 12/01/2019     12/01/2019    MCH 31 7 12/01/2019    MCHC 33 3 12/01/2019    RDW 14 4 12/01/2019    MPV 9 7 12/01/2019    NRBC 0 12/01/2019    SODIUM 142 12/01/2019     (H) 12/01/2019    CO2 19 (L) 12/01/2019    BUN 38 (H) 12/01/2019    CREATININE 1 63 (H) 12/01/2019    CALCIUM 7 8 (L) 12/01/2019    AST 71 (H) 12/01/2019    ALT 22 12/01/2019    ALKPHOS 81 12/01/2019    EGFR 38 12/01/2019       Imaging Studies: I have personally reviewed pertinent reports  EKG, Pathology, and Other Studies: I have personally reviewed pertinent reports        VTE Pharmacologic Prophylaxis: Sequential compression device (Venodyne)     VTE Mechanical Prophylaxis: sequential compression device

## 2019-12-01 NOTE — CONSULTS
Consultation - General-Cardiology   Leodan Martinez 80 y o  male MRN: [id number]  Unit/Bed#: ICU 12 Encounter: [id number]            Inpatient consult to Cardiology     Performed by  Margarete Schlatter, MD     Authorized by Rick Holm MD              Assessment/Plan     Assessment and Plan:  1  Atrial fibrillation  -this appears to be new onset as there is no documentation on previous ECG or chart review  -patient is status post tPA late Friday night and with possible right frontal lobe petechial hemorrhage  -patient currently on Levophed at 14 mcg  -heart rate varying from high 90s to 110s overnight  -usually on metoprolol succinate 25 mg daily at home which is currently withheld    2  Acute decreased LV function  -EF dropped from 50% to 30% this admission since April 2019 transthoracic echo  -patient euvolemic on exam and weight trend overall down trending from 106 to now 95 kg  -currently off of all heart medications including home furosemide 40 mg oral daily    3  CAD/AS  -with known 5 vessel CABG (LIMA-LAD, SVG-D1, sequential SVG-RPDA/RPLS, SVG-OM1) and aortic SVR (#27 mm tissue valve)  -no evidence of stenosis on transthoracic echo indicating well-functioning valve  -home regiment aspirin 81 mg daily, amlodipine 5 mg daily, metoprolol succinate 25 mg daily, rosuvastatin 10 mg daily and furosemide 40 mg oral daily      PLAN:  1  Patient would benefit from anticoagulation however in the setting of recent tPA administration as well as possible intracranial bleed, with would hold initiation and defer to Neurology as to timing of anticoagulation    2  In setting of his acute decompensated heart failure, patient would benefit from up to medical therapy however is still requiring pressor support  If able to wean off pressors, would consider resuming home beta blockade with metoprolol tartrate or succinate    Creatinine close to baseline as compared to lab work in 2018, would withhold initiation of ACEi/ARB/ARNI as well however    3  Cardiology to continue following along        Case discussed and reviewed with Dr Tavares Block who agrees with my assessment and plan  Thank you for involving us in the care of your patient  History of Present Illness   Physician Requesting Consult: No att  providers found  Reason for Consult / Principal Problem:  Atrial fibrillation/heart failure    HPI: Gricel Olmos is a 80y o  year old male with a history of coronary artery disease, aortic stenosis, status post 5 vessel CABG 2004 (LIMA-LAD, SVG-D1, sequential SVG-RPDA/RPLS, SVG-OM1), concomitant AV replacement (#27 mm tissue valve), hypertension, hyperlipidemia, peripheral vascular disease, carotid stenosis s/p CEA 2009 and stroke who presented to [facility] with stroke-like symptoms      Patient required systemic thrombolysis with tPA as well as endovascular thrombectomy   Hospital course was complicated by significant right-sided worsening cytotoxic cerebral edema with possible petechial hemorrhage in the right frontal lobe  Patient was found to also be in atrial fibrillation and Cardiology was consulted for further evaluation  As patient is fully sedated and intubated, history was obtained primarily from chart review, primary team and patient's son  As per son, patient was having substernal chest pain at home with gradual use of nitroglycerin  Son denies any previous history of atrial fibrillation but does not recall the perioperative period  He denies any times patient required anticoagulation with either warfarin or other NOACs  Any further review of system could not be elicited as patient is intubated and sedated          Review of Systems  Unable to obtain due to clinical status        Historical Information   Past Medical History:   Diagnosis Date    Hypertension     Polyneuropathy     [facility] [address] [facility]     [facility]     approx 2009     Past Surgical History:   Procedure Laterality Date    CAROTID ENDARTARECTOMY Left     approx     IR STROKE ALERT  2019     Social History     Substance and Sexual Activity   Alcohol Use Not Currently    Frequency: Never    Binge frequency: Never     Social History     Substance and Sexual Activity   Drug Use No     Social History     Tobacco Use   Smoking Status Former Smoker    Types: Cigarettes    Last attempt to quit: Deronda Sicard Years since quittin 9   Smokeless Tobacco Never Used     Family History: History reviewed  No pertinent family history      Meds/Allergies   Hospital Medications:   Current Facility-Administered Medications   Medication Dose Route Frequency    acetaminophen (TYLENOL) oral suspension 650 mg  650 mg Oral Q6H PRN    cefTRIAXone (ROCEPHIN) 1,000 mg in dextrose 5 % 50 mL IVPB  1,000 mg Intravenous Q24H    chlorhexidine (PERIDEX) 0 12 % oral rinse 15 mL  15 mL Swish & Spit Q12H [address]    dexmedetomidine (PRECEDEX) 400 mcg in sodium chloride 0 9 % 100 mL infusion  0 1-0 7 mcg/kg/hr Intravenous Titrated    fentaNYL 1000 mcg in sodium chloride 0 9% 100mL infusion  25 mcg/hr Intravenous Continuous    fentaNYL 1000 mcg in sodium chloride 0 9% 100mL infusion  50 mcg/hr Intravenous Continuous    fentanyl citrate (PF) 100 MCG/2ML 25 mcg  25 mcg Intravenous Q2H PRN    hydrALAZINE (APRESOLINE) injection 10 mg  10 mg Intravenous Q6H PRN    insulin lispro (HumaLOG) 100 units/mL subcutaneous injection 1-5 Units  1-5 Units Subcutaneous Q6H [address]    ipratropium (ATROVENT) 0 02 % inhalation solution 0 5 mg  0 5 mg Nebulization Q6H    Labetalol HCl (NORMODYNE) injection 10 mg  10 mg Intravenous Q6H PRN    levalbuterol (XOPENEX) inhalation solution 1 25 mg  1 25 mg Nebulization Q6H    metroNIDAZOLE (FLAGYL) IVPB (premix) 500 mg  500 mg Intravenous Q8H    norepinephrine (LEVOPHED) 8 mg (DOUBLE CONCENTRATION) IV in sodium chloride 0 9% 250 mL  1-30 mcg/min Intravenous Titrated    pantoprazole (PROTONIX) injection 40 mg  40 mg Intravenous Q24H [facility]     Home Medications:   Medications Prior to Admission   Medication    amLODIPine (NORVASC) 5 mg tablet    aspirin 81 MG tablet    calcium carbonate (OS-JESUS) 1250 (500 Ca) MG tablet    Cholecalciferol (VITAMIN D3) 5000 units CAPS    furosemide (LASIX) 40 mg tablet    metoprolol succinate (TOPROL-XL) 25 mg 24 hr tablet    Multiple Vitamins-Minerals (PRESERVISION AREDS 2+MULTI VIT) CAPS    Omega-3 Fatty Acids (FISH OIL) 645 MG CAPS    omeprazole (PriLOSEC) 20 mg delayed release capsule    rOPINIRole (REQUIP) 0 25 mg tablet    rosuvastatin (CRESTOR) 10 MG tablet    brimonidine (ALPHAGAN P) 0 15 % ophthalmic solution    dorzolamide (TRUSOPT) 2 % ophthalmic solution    gabapentin (NEURONTIN) 300 mg capsule    gabapentin (NEURONTIN) 300 mg capsule    LATANOPROST OP    nitroglycerin (NITRODUR) 0 6 mg/hr    RHOPRESSA 0 02 % SOLN       Allergies   Allergen Reactions    Penicillins Rash     rash       Objective   Vitals: Blood pressure 160/71, pulse 104, temperature 100 4 °F (38 °C), resp  rate (!) 30, height 5' 10" (1 778 m), weight 95 kg (209 lb 7 oz), SpO2 97 %  Orthostatic Blood Pressures      Most Recent Value   Blood Pressure  160/71 filed at 12/01/2019 0800   Patient Position - Orthostatic VS  Lying filed at 12/01/2019 0800            Invasive Devices     Central Venous Catheter Line            CVC Central Lines 11/30/19 Triple 20cm less than 1 day          Peripheral Intravenous Line            Peripheral IV 11/30/19 Right Hand 1 day    Peripheral IV 11/30/19 Right Antecubital less than 1 day          Arterial Line            Arterial Line 11/30/19 Left Radial 1 day          Drain            NG/OG Tube Orogastric Right mouth 1 day    NG/OG/Enteral Tube Orogastric 18 Fr Right mouth 1 day    Urethral Catheter Temperature probe 16 Fr  1 day          Airway            ETT  Cuffed; Hi-Lo 8 mm 1 day                Physical Exam  General Appearance:    RASS -3, no distress, appears stated age   Head:    Normocephalic, atraumatic   Eyes:    Fixed right pupils, left reactive, conjunctiva/corneas clear   Neck:   Supple, No thyroid enlargement, difficult to assess carotid bruit, no JVD   Lungs:     Mechanical breath sounds bilaterally, hissing sound emanating from oral cavity, respirations exceeding backup rate of 20   Heart:   Normal Rate, irregular rhythm, S1 and S2 normal, no murmur, rub or gallop   Abdomen:     Soft, non-tender, bowel sounds active all four quadrants   Extremities:   Extremities normal, atraumatic, no edema, warm to touch   Pulses:   2+ and symmetric all extremities   Skin:   Warm, Dry, intact   Neurologic:   Fixed right pupil, reactive left, difficult to assess further as patient is fully sedated       Lab Results: I have personally reviewed pertinent lab results  Results from last 7 days   Lab Units 12/01/19 0440 11/30/19  0843 11/30/19  0525  11/30/19  0015   WBC Thousand/uL 22 09*  --  20 71*  --  11 31*   HEMOGLOBIN g/dL 12 6 14 5 15 9  --  14 3   I STAT HEMOGLOBIN   --   --   --    < > 15 0   HEMATOCRIT % 37 8 43 1 48 0  --  42 6   HEMATOCRIT, ISTAT   --   --   --    < > 44   PLATELETS Thousands/uL 232  --  297  --  252    < > = values in this interval not displayed       Results from last 7 days   Lab Units 12/01/19 0440 11/30/19  0843 11/30/19  0525 11/30/19  0016   POTASSIUM mmol/L 4 6 4 0 5 0  --    CHLORIDE mmol/L 113* 112* 106  --    CO2 mmol/L 19* 20* 22  --    CO2, I-STAT mmol/L  --   --   --  27   BUN mg/dL 38* 24 24  --    CREATININE mg/dL 1 63* 1 27 1 30  --    GLUCOSE, ISTAT mg/dl  --   --   --  109   CALCIUM mg/dL 7 8* 7 6* 8 2*  --      Results from last 7 days   Lab Units 11/30/19  0015   INR  1 02   PTT seconds 29     Results from last 7 days   Lab Units 11/30/19  0525   MAGNESIUM mg/dL 2 1       Imaging: I have personally reviewed pertinent films in PACS    Holter:     ECHO:   Results for orders placed during the hospital encounter of 11/30/19   Echo complete with contrast if indicated    Narrative [address]  [facility] [place], [address] [facility]  [phone]    Transthoracic Echocardiogram  2D, M-mode, Doppler, and Color Doppler    Study date:  2019    Patient: Phyllis Alexis  MR number: [id number]  Account number: [de-identified]  : 1933  Age: 80 years  Gender: Male  Status: Inpatient  Location: Bedside  Height: 70 in  Weight: 218 lb  BP: 170/ 90 mmHg    Indications: TIA    Diagnoses: G45 9 - Transient cerebral ischemic attack, unspecified    Sonographer:  CAROLIN Whitfield  Primary Physician:  Lizz Mayorga MD  Group:  [id number]  Interpreting Physician:  Megan Hill MD    SUMMARY    LEFT VENTRICLE:  Systolic function was moderately reduced  Ejection fraction was estimated in the range of 30 %  This study was inadequate for the evaluation of regional wall motion  Wall thickness was increased  Concentric hypertrophy was present  RIGHT VENTRICLE:  Systolic function was reduced  LEFT ATRIUM:  The atrium was dilated  RIGHT ATRIUM:  The atrium was dilated  MITRAL VALVE:  There was mild to moderate regurgitation  AORTIC VALVE:  LVOT VTI ~12 cm  A bioprosthesis was present  It exhibited normal function  TRICUSPID VALVE:  There was trace regurgitation  COMPARISONS:  There has been interval decrease in BiV function with progression in MR  Comparison was made with the previous study of 2019  HISTORY: PRIOR HISTORY: CAD, CABG, HTN, Carotid stenosis, AVR, PVD    PROCEDURE: The procedure was performed at the bedside  This was a routine study  The transthoracic approach was used  The study included complete 2D imaging, M-mode, complete spectral Doppler, and color Doppler  The heart rate was 115 bpm,  at the start of the study  Images were obtained from the parasternal, apical, subcostal, and suprasternal notch acoustic windows   Echocardiographic views were limited due to restricted patient mobility and patient on mechanical ventilator  This was a technically difficult study  LEFT VENTRICLE: Size was normal  Systolic function was moderately reduced  Ejection fraction was estimated in the range of 30 %  This study was inadequate for the evaluation of regional wall motion  Wall thickness was increased  Concentric  hypertrophy was present  DOPPLER: The study was not technically sufficient to allow evaluation of LV diastolic function  RIGHT VENTRICLE: The size was normal  Systolic function was reduced  LEFT ATRIUM: The atrium was dilated  RIGHT ATRIUM: The atrium was dilated  MITRAL VALVE: DOPPLER: There was no evidence for stenosis  There was mild to moderate regurgitation  AORTIC VALVE: LVOT VTI ~12 cm  A bioprosthesis was present  It exhibited normal function  DOPPLER: There was no evidence for stenosis  There was no significant regurgitation  TRICUSPID VALVE: DOPPLER: There was trace regurgitation  PULMONIC VALVE: DOPPLER: There was no evidence for stenosis  There was no significant regurgitation  PERICARDIUM: There was no pericardial effusion  AORTA: The root exhibited normal size  SYSTEM MEASUREMENT TABLES    2D  %FS: 19 19 %  Ao Diam: 2 86 cm  EDV(Teich): 142 26 ml  EF Biplane: 27 25 %  EF(Teich): 39 13 %  ESV(Teich): 86 59 ml  IVSd: 1 47 cm  LA Area: 26 24 cm2  LA Diam: 4 74 cm  LVEDV MOD A2C: 133 11 ml  LVEDV MOD A4C: 114 09 ml  LVEDV MOD BP: 124 3 ml  LVEF MOD A2C: 27 2 %  LVEF MOD A4C: 24 84 %  LVESV MOD A2C: 96 91 ml  LVESV MOD A4C: 85 75 ml  LVESV MOD BP: 90 43 ml  LVIDd: 5 42 cm  LVIDs: 4 38 cm  LVLd A2C: 8 48 cm  LVLd A4C: 8 3 cm  LVLs A2C: 8 19 cm  LVLs A4C: 8 2 cm  LVPWd: 1 23 cm  RA Area: 27 22 cm2  RVIDd: 3 88 cm  SV MOD A2C: 36 21 ml  SV MOD A4C: 28 34 ml  SV(Teich): 55 67 ml    CW  AV Env  Ti: 228 35 ms  AV VTI: 26 07 cm  AV Vmax: 1 68 m/s  AV Vmean: 1 14 m/s  AV maxP 28 mmHg  AV meanP 02 mmHg    MM  TAPSE: 1 55 cm    PW  LVOT Env  Ti: 235 97 ms  LVOT VTI: 11 9 cm  LVOT Vmax: 0 69 m/s  LVOT Vmean: 0 5 m/s  LVOT maxP 9 mmHg  LVOT meanP 15 mmHg    [facility] Accredited Echocardiography Laboratory    Prepared and electronically signed by    Nereida Garay MD  Signed [id number] 12:24:59         CATH/STRESS TEST:   Leigh Ann Biggs  Rest/Stress Gated SPECT Myocardial Perfusion Imaging After     INDICATIONS: Evaluation of known coronary artery disease      SUMMARY:  -  Clinical question: Evaluation of known coronary artery disease   -  Stress results: Duration of pharmacologic stress was 3 min  Maximal systolic blood pressure during stress was 182 mmHg  There was no chest pain during stress  -  ECG conclusions: The stress ECG was equivocal for ischemia  Arrhythmia during stress: isolated premature ventricular beats  -  Perfusion imaging: The TID ratio was 1  14  There was a moderate to large, reversible myocardial perfusion defect of the inferior wall  -  Gated SPECT: The calculated left ventricular ejection fraction was 45 %      Prepared and signed by     Michelet Llamas MD  Signed 2017 16:47:50      [id number]         VTE Prophylaxis: Sequential compression device (Venodyne)       Counseling / Coordination of Care  Total floor / unit time spent today 30 minutes minutes  Greater than 50% of total time was spent with the patient and / or family counseling and / or coordination of care

## 2019-12-01 NOTE — RESPIRATORY THERAPY NOTE
resp care      12/01/19 0830   Respiratory Assessment   Resp Comments pt transported to Ct scan via portable vent and an ambubag, returned from Ct scan, placed pt back on vent, same vent settings, no problems or issues, will continue to monitor

## 2019-12-01 NOTE — PLAN OF CARE
Problem: Prexisting or High Potential for Compromised Skin Integrity  Goal: Skin integrity is maintained or improved  Description  INTERVENTIONS:  - Identify patients at risk for skin breakdown  - Assess and monitor skin integrity  - Assess and monitor nutrition and hydration status  - Monitor labs   - Assess for incontinence   - Turn and reposition patient  - Assist with mobility/ambulation  - Relieve pressure over bony prominences  - Avoid friction and shearing  - Provide appropriate hygiene as needed including keeping skin clean and dry  - Evaluate need for skin moisturizer/barrier cream  - Collaborate with interdisciplinary team   - Patient/family teaching  - Consider wound care consult   Outcome: Progressing     Problem: Potential for Falls  Goal: Patient will remain free of falls  Description  INTERVENTIONS:  - Assess patient frequently for physical needs  -  Identify cognitive and physical deficits and behaviors that affect risk of falls    -  [place] fall precautions as indicated by assessment   - Educate patient/family on patient safety including physical limitations  - Instruct patient to call for assistance with activity based on assessment  - Modify environment to reduce risk of injury  - Consider OT/PT consult to assist with strengthening/mobility  Outcome: Progressing     Problem: PAIN - ADULT  Goal: Verbalizes/displays adequate comfort level or baseline comfort level  Description  Interventions:  - Encourage patient to monitor pain and request assistance  - Assess pain using appropriate pain scale  - Administer analgesics based on type and severity of pain and evaluate response  - Implement non-pharmacological measures as appropriate and evaluate response  - Consider cultural and social influences on pain and pain management  - Notify physician/advanced practitioner if interventions unsuccessful or patient reports new pain  Outcome: Progressing     Problem: INFECTION - ADULT  Goal: Absence or prevention of progression during hospitalization  Description  INTERVENTIONS:  - Assess and monitor for signs and symptoms of infection  - Monitor lab/diagnostic results  - Monitor all insertion sites, i e  indwelling lines, tubes, and drains  - Monitor endotracheal if appropriate and nasal secretions for changes in amount and color  - [place] appropriate cooling/warming therapies per order  - Administer medications as ordered  - Instruct and encourage patient and family to use good hand hygiene technique  - Identify and instruct in appropriate isolation precautions for identified infection/condition  Outcome: Progressing  Goal: Absence of fever/infection during neutropenic period  Description  INTERVENTIONS:  - Monitor WBC    Outcome: Progressing     Problem: SAFETY ADULT  Goal: Maintain or return to baseline ADL function  Description  INTERVENTIONS:  -  Assess patient's ability to carry out ADLs; assess patient's baseline for ADL function and identify physical deficits which impact ability to perform ADLs (bathing, care of mouth/teeth, toileting, grooming, dressing, etc )  - Assess/evaluate cause of self-care deficits   - Assess range of motion  - Assess patient's mobility; develop plan if impaired  - Assess patient's need for assistive devices and provide as appropriate  - Encourage maximum independence but intervene and supervise when necessary  - Involve family in performance of ADLs  - Assess for home care needs following discharge   - Consider OT consult to assist with ADL evaluation and planning for discharge  - Provide patient education as appropriate  Outcome: Progressing  Goal: Maintain or return mobility status to optimal level  Description  INTERVENTIONS:  - Assess patient's baseline mobility status (ambulation, transfers, stairs, etc )    - Identify cognitive and physical deficits and behaviors that affect mobility  - Identify mobility aids required to assist with transfers and/or ambulation (gait belt, sit-to-stand, lift, walker, cane, etc )  - [place] fall precautions as indicated by assessment  - Record patient progress and toleration of activity level on Mobility SBAR; progress patient to next Phase/Stage  - Instruct patient to call for assistance with activity based on assessment  - Consider rehabilitation consult to assist with strengthening/weightbearing, etc   Outcome: Progressing     Problem: DISCHARGE PLANNING  Goal: Discharge to home or other facility with appropriate resources  Description  INTERVENTIONS:  - Identify barriers to discharge w/patient and caregiver  - Arrange for needed discharge resources and transportation as appropriate  - Identify discharge learning needs (meds, wound care, etc )  - Arrange for interpretive services to assist at discharge as needed  - Refer to Case Management Department for coordinating discharge planning if the patient needs post-hospital services based on physician/advanced practitioner order or complex needs related to functional status, cognitive ability, or social support system  Outcome: Progressing     Problem: Knowledge Deficit  Goal: Patient/family/caregiver demonstrates understanding of disease process, treatment plan, medications, and discharge instructions  Description  Complete learning assessment and assess knowledge base  Interventions:  - Provide teaching at level of understanding  - Provide teaching via preferred learning methods  Outcome: Progressing     Problem: Neurological Deficit  Goal: Neurological status is stable or improving  Description  Interventions:  - Monitor and assess patient's level of consciousness, motor function, sensory function, and level of assistance needed for ADLs  - Monitor and report changes from baseline  Collaborate with interdisciplinary team to initiate plan and implement interventions as ordered  - Provide and maintain a safe environment  - Consider seizure precautions  - Consider fall precautions    - Consider aspiration precautions  - Consider bleeding precautions  Outcome: Progressing     Problem: Activity Intolerance/Impaired Mobility  Goal: Mobility/activity is maintained at optimum level for patient  Description  Interventions:  - Assess and monitor patient  barriers to mobility and need for assistive/adaptive devices  - Assess patient's emotional response to limitations  - Collaborate with interdisciplinary team and initiate plans and interventions as ordered  - Encourage independent activity per ability   - Maintain proper body alignment  - Perform active/passive rom as tolerated/ordered  - Plan activities to conserve energy   - Turn patient as appropriate  Outcome: Progressing     Problem: Communication Impairment  Goal: Ability to express needs and understand communication  Description  Assess patient's communication skills and ability to understand information  Patient will demonstrate use of effective communication techniques, alternative methods of communication and understanding even if not able to speak  - Encourage communication and provide alternate methods of communication as needed  - Collaborate with case management/ for discharge needs  - Include patient/family/caregiver in decisions related to communication  Outcome: Progressing     Problem: Potential for Aspiration  Goal: Ventilated patient's risk of aspiration is minimized  Description  Assess and monitor vital signs, respiratory status, airway cuff pressure, and labs (WBC)  Monitor for signs of aspiration (tachypnea, cough, rales, wheezing, cyanosis, fever)  - Elevate head of bed 30 degrees if patient has tube feeding   - Monitor tube feeding    Outcome: Progressing     Problem: NEUROSENSORY - ADULT  Goal: Achieves stable or improved neurological status  Description  INTERVENTIONS  - Monitor and report changes in neurological status  - Monitor vital signs such as temperature, blood pressure, glucose, and any other labs ordered   - Initiate measures to prevent increased intracranial pressure  - Monitor for seizure activity and implement precautions if appropriate      Outcome: Progressing  Goal: Achieves maximal functionality and self care  Description  INTERVENTIONS  - Monitor swallowing and airway patency with patient fatigue and changes in neurological status  - Encourage and assist patient to increase activity and self care     - Encourage visually impaired, hearing impaired and aphasic patients to use assistive/communication devices  Outcome: Progressing     Problem: CARDIOVASCULAR - ADULT  Goal: Maintains optimal cardiac output and hemodynamic stability  Description  INTERVENTIONS:  - Monitor I/O, vital signs and rhythm  - Monitor for S/S and trends of decreased cardiac output  - Administer and titrate ordered vasoactive medications to optimize hemodynamic stability  - Assess quality of pulses, skin color and temperature  - Assess for signs of decreased coronary artery perfusion  - Instruct patient to report change in severity of symptoms  Outcome: Progressing  Goal: Absence of cardiac dysrhythmias or at baseline rhythm  Description  INTERVENTIONS:  - Continuous cardiac monitoring, vital signs, obtain 12 lead EKG if ordered  - Administer antiarrhythmic and heart rate control medications as ordered  - Monitor electrolytes and administer replacement therapy as ordered  Outcome: Progressing     Problem: RESPIRATORY - ADULT  Goal: Achieves optimal ventilation and oxygenation  Description  INTERVENTIONS:  - Assess for changes in respiratory status  - Assess for changes in mentation and behavior  - Position to facilitate oxygenation and minimize respiratory effort  - Oxygen administered by appropriate delivery if ordered  - Initiate smoking cessation education as indicated  - Encourage broncho-pulmonary hygiene including cough, deep breathe, Incentive Spirometry  - Assess the need for suctioning and aspirate as needed  - Assess and instruct to report SOB or any respiratory difficulty  - Respiratory Therapy support as indicated  Outcome: Progressing     Problem: GASTROINTESTINAL - ADULT  Goal: Minimal or absence of nausea and/or vomiting  Description  INTERVENTIONS:  - Administer IV fluids if ordered to ensure adequate hydration  - Maintain NPO status until nausea and vomiting are resolved  - Nasogastric tube if ordered  - Administer ordered antiemetic medications as needed  - Provide nonpharmacologic comfort measures as appropriate  - Advance diet as tolerated, if ordered  - Consider nutrition services referral to assist patient with adequate nutrition and appropriate food choices  Outcome: Progressing     Problem: GENITOURINARY - ADULT  Goal: Maintains or returns to baseline urinary function  Description  INTERVENTIONS:  - Assess urinary function  - Encourage oral fluids to ensure adequate hydration if ordered  - Administer IV fluids as ordered to ensure adequate hydration  - Administer ordered medications as needed  - Offer frequent toileting  - Follow urinary retention protocol if ordered  Outcome: Progressing  Goal: Urinary catheter remains patent  Description  INTERVENTIONS:  - Assess patency of urinary catheter  - If patient has a chronic arizmendi, consider changing catheter if non-functioning  - Follow guidelines for intermittent irrigation of non-functioning urinary catheter  Outcome: Progressing     Problem: METABOLIC, FLUID AND ELECTROLYTES - ADULT  Goal: Electrolytes maintained within normal limits  Description  INTERVENTIONS:  - Monitor labs and assess patient for signs and symptoms of electrolyte imbalances  - Administer electrolyte replacement as ordered  - Monitor response to electrolyte replacements, including repeat lab results as appropriate  - Instruct patient on fluid and nutrition as appropriate  Outcome: Progressing  Goal: Fluid balance maintained  Description  INTERVENTIONS:  - Monitor labs   - Monitor I/O and WT  - Instruct patient on fluid and nutrition as appropriate  - Assess for signs & symptoms of volume excess or deficit  Outcome: Progressing     Problem: SKIN/TISSUE INTEGRITY - ADULT  Goal: Skin integrity remains intact  Description  INTERVENTIONS  - Identify patients at risk for skin breakdown  - Assess and monitor skin integrity  - Assess and monitor nutrition and hydration status  - Monitor labs (i e  albumin)  - Assess for incontinence   - Turn and reposition patient  - Assist with mobility/ambulation  - Relieve pressure over bony prominences  - Avoid friction and shearing  - Provide appropriate hygiene as needed including keeping skin clean and dry  - Evaluate need for skin moisturizer/barrier cream  - Collaborate with interdisciplinary team (i e  Nutrition, Rehabilitation, etc )   - Patient/family teaching  Outcome: Progressing  Goal: Incision(s), wounds(s) or drain site(s) healing without S/S of infection  Description  INTERVENTIONS  - Assess and document risk factors for skin impairment   - Assess and document dressing, incision, wound bed, drain sites and surrounding tissue  - Consider nutrition services referral as needed  - Oral mucous membranes remain intact  - Provide patient/ family education  Outcome: Progressing  Goal: Oral mucous membranes remain intact  Description  INTERVENTIONS  - Assess oral mucosa and hygiene practices  - Implement preventative oral hygiene regimen  - Implement oral medicated treatments as ordered  - Initiate Nutrition services referral as needed  Outcome: Progressing     Problem: HEMATOLOGIC - ADULT  Goal: Maintains hematologic stability  Description  INTERVENTIONS  - Assess for signs and symptoms of bleeding or hemorrhage  - Monitor labs  - Administer supportive blood products/factors as ordered and appropriate  Outcome: Progressing     Problem: MUSCULOSKELETAL - ADULT  Goal: Maintain or return mobility to safest level of function  Description  INTERVENTIONS:  - Assess patient's ability to carry out ADLs; assess patient's baseline for ADL function and identify physical deficits which impact ability to perform ADLs (bathing, care of mouth/teeth, toileting, grooming, dressing, etc )  - Assess/evaluate cause of self-care deficits   - Assess range of motion  - Assess patient's mobility  - Assess patient's need for assistive devices and provide as appropriate  - Encourage maximum independence but intervene and supervise when necessary  - Involve family in performance of ADLs  - Assess for home care needs following discharge   - Consider OT consult to assist with ADL evaluation and planning for discharge  - Provide patient education as appropriate  Outcome: Progressing     Problem: COPING  Goal: Pt/Family able to verbalize concerns and demonstrate effective coping strategies  Description  INTERVENTIONS:  - Assist patient/family to identify coping skills, available support systems and cultural and spiritual values  - Provide emotional support, including active listening and acknowledgement of concerns of patient and caregivers  - Reduce environmental stimuli, as able  - Provide patient education  - Assess for spiritual pain/suffering and initiate spiritual care, including notification of Pastoral Care or lit based community as needed  - Assess effectiveness of coping strategies  Outcome: Progressing  Goal: Will report anxiety at manageable levels  Description  INTERVENTIONS:  - Administer medication as ordered  - Teach and encourage coping skills  - Provide emotional support  - Assess patient/family for anxiety and ability to cope  Outcome: Progressing     Problem: Nutrition/Hydration-ADULT  Goal: Nutrient/Hydration intake appropriate for improving, restoring or maintaining nutritional needs  Description  Monitor and assess patient's nutrition/hydration status for malnutrition  Collaborate with interdisciplinary team and initiate plan and interventions as ordered    Monitor patient's weight and dietary intake as ordered or per policy  Utilize nutrition screening tool and intervene as necessary  Determine patient's food preferences and provide high-protein, high-caloric foods as appropriate       INTERVENTIONS:  - Monitor oral intake, urinary output, labs, and treatment plans  - Assess nutrition and hydration status and recommend course of action  - Evaluate amount of meals eaten  - Assist patient with eating if necessary   - Allow adequate time for meals  - Recommend/ encourage appropriate diets, oral nutritional supplements, and vitamin/mineral supplements  - Order, calculate, and assess calorie counts as needed  - Recommend, monitor, and adjust tube feedings and TPN/PPN based on assessed needs  - Assess need for intravenous fluids  - Provide specific nutrition/hydration education as appropriate  - Include patient/family/caregiver in decisions related to nutrition  Outcome: Progressing

## 2019-12-01 NOTE — PROGRESS NOTES
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, s/p TPA and endovascular thrombectomy  Exam under sedation only with cranial nerves present  Now complicated by multiple medical comorbidities including low EF, and pneumonitis  -exam with significant amount of ctyotoxic edema  -OK to start an antiplatelet and statin today  -goals of care discussion with family given the significant stroke burden despite optimal acute stroke interventions  -supportive care per ICU  -neurology to follow  ADDENDUM (11:40): had a family meeting with ICU attending and extended patient family  Discussed the prognosis of R GI/MCA/PCA stroke, requiring constant supervision, and likely immobile, complete dependence for ADL's, and being bedbound  This would be in the context of being without any significant worsening in his neurological status with a repeat stroke, or worsening of his multiple other medical comorbidities  The family explained that this outcome would be a complete deviation from any that the patient would accept  We also discussed comfort care measures as an option  The family will discuss this, but they are likely to withdraw care  Please page neurology if there is anything further we can assist in  Thank you for allowing us to care for this patient  Subjective:   I examined the patient at the bedside  Family was gathered  Overnight CTH was repeated  Vitals: Blood pressure 160/71, pulse 94, temperature 100 °F (37 8 °C), resp  rate (!) 26, height 5' 10" (1 778 m), weight 95 kg (209 lb 7 oz), SpO2 98 %  ,Body mass index is 30 05 kg/m²  Physical Exam:     GEN: intubated, sedated with fentanyl and dexmetomadate  CV: irregularly irregular  PULM: course breath sounds       NEURO:   Sedated, intubated, not opening eyes, not following commands  Pupis 2mm, sluggishly reactive, occulocephalic and corneal present  Does not blink to threat  MOTOR: normal bulk and tone, no withdraw to stimulus in upper extremities  Lower extremities will dorsiflex at the ankle to noxious stimulus  REFLEXES: absent BB, BR, patellar, toes bilaterally with prominent withdraw, equivocally upgoing, symmetric    Lab, Imaging and other studies:   CBC:   Results from last 7 days   Lab Units 12/01/19 0440 11/30/19 0843 11/30/19  0525  11/30/19  0015   WBC Thousand/uL 22 09*  --  20 71*  --  11 31*   RBC Million/uL 3 98  --  5 01  --  4 51   HEMOGLOBIN g/dL 12 6 14 5 15 9  --  14 3   I STAT HEMOGLOBIN   --   --   --    < > 15 0   HEMATOCRIT % 37 8 43 1 48 0  --  42 6   HEMATOCRIT, ISTAT   --   --   --    < > 44   MCV fL 95  --  96  --  95   PLATELETS Thousands/uL 232  --  297  --  252    < > = values in this interval not displayed    , BMP/CMP:   Results from last 7 days   Lab Units 12/01/19 0440 11/30/19  0843 11/30/19  0525 11/30/19 0016 11/30/19  0015   SODIUM mmol/L 142 139 136  --  138   POTASSIUM mmol/L 4 6 4 0 5 0  --  4 5   CHLORIDE mmol/L 113* 112* 106  --  105   CO2 mmol/L 19* 20* 22  --  27   CO2, I-STAT mmol/L  --   --   --  27 25   BUN mg/dL 38* 24 24  --  23   CREATININE mg/dL 1 63* 1 27 1 30  --  1 40*   GLUCOSE, ISTAT mg/dl  --   --   --  109 109   CALCIUM mg/dL 7 8* 7 6* 8 2*  --  9 2   AST U/L 71*  --   --   --   --    ALT U/L 22  --   --   --   --    ALK PHOS U/L 81  --   --   --   --    EGFR ml/min/1 73sq m 38 51 49  --  49  39     CTH images reviewed: hypodensity in the R GI territory and superior division of the R MCA, and the R PCA  There is petechial hemorrhage within the GI infarct

## 2019-12-01 NOTE — PROGRESS NOTES
Family meeting    A family meeting was held with patient's children and grandchildren in immediate family with Dr Otis Mora and myself  Review reviewed his course of care and our rationale for the current management ensured with them the imaging his recent CT scan comparison to that dose during his stay  We discussed our recent findings of evolving, large right hemispheric infarct with edema and mass effect  We discussed the neurologic impact of this infarct which included severe limitations likely requiring long-term nursing care and significant debilitation  This is in this setting of best case scenario should this patient survive  We also discussed concurrent issue of likely aspiration pneumonia and ARDS along with significant worsening of his systolic function and likely worsening cardiomyopathy  The patient's family will were all in agreement that this patient would not desire an outcome whereby he could not return to his previous function and independent living condition and therefore we discussed other options  We have agreed that the patient would be made DNR and no escalation of care would be initiated  A son from the [facility] is seeking to travel and we will continue current level of care but it also ensure comfort at all costs  A comfort carts were ordered, pastoral care was offered but family is to reach out to their parents  and we have offered to be available for any further questions  Patient's nursing team well aware of his plan of care  Family meeting time 20 minutes

## 2019-12-02 PROBLEM — J80 ARDS (ADULT RESPIRATORY DISTRESS SYNDROME) (HCC): Status: ACTIVE | Noted: 2019-01-01

## 2019-12-02 PROBLEM — I63.511 ACUTE ISCHEMIC RIGHT MCA STROKE (HCC): Status: ACTIVE | Noted: 2019-01-01

## 2019-12-02 PROBLEM — I95.2 HYPOTENSION DUE TO DRUGS: Status: ACTIVE | Noted: 2019-01-01

## 2019-12-02 PROBLEM — J69.0 ASPIRATION PNEUMONIA (HCC): Status: ACTIVE | Noted: 2019-01-01

## 2019-12-02 PROBLEM — N18.30 STAGE 3 CHRONIC KIDNEY DISEASE (HCC): Status: ACTIVE | Noted: 2019-01-01

## 2019-12-02 PROBLEM — I25.5 ISCHEMIC CARDIOMYOPATHY: Status: ACTIVE | Noted: 2019-01-01

## 2019-12-02 NOTE — PROGRESS NOTES
12/02/19 1500   Clinical Encounter Type   Visited With Patient and family together   Routine Visit Follow-up   Crisis Visit Critical Care   P[employer]ism Encounters   P[employer] Needs Prayer   Patient Spiritual Encounters   Spiritual Encounter Notes  can be reached at [phone]

## 2019-12-02 NOTE — RESPIRATORY THERAPY NOTE
RT Ventilator Management Note  Madeline Short 80 y o  male MRN: [id number]  Unit/Bed#: ICU 12 Encounter: [id number]      Daily Screen       11/30/2019  0805 12/1/2019  0744          Patient safety screen outcome[de-identified]  Failed  Failed      Not Ready for Weaning due to[de-identified]    FiO2 >60%;PEEP > 8cmH2O;Underline problem not resolved; Hemodynamically unstable              Physical Exam:   Assessment Type: (P) Assess only  General Appearance: (P) Sedated  Respiratory Pattern: (P) Assisted, Tachypneic  Chest Assessment: (P) Chest expansion symmetrical  Bilateral Breath Sounds: (P) Diminished, Coarse  O2 Device: (P) vent  Subjective Data: (P) n/a      Resp Comments: (P) No changes made to the vent overnight  PT is stable and tolerating current settings  WIll cont to montior pt per protocol

## 2019-12-02 NOTE — PLAN OF CARE
Problem: Prexisting or High Potential for Compromised Skin Integrity  Goal: Skin integrity is maintained or improved  Description  INTERVENTIONS:  - Identify patients at risk for skin breakdown  - Assess and monitor skin integrity  - Assess and monitor nutrition and hydration status  - Monitor labs   - Assess for incontinence   - Turn and reposition patient  - Assist with mobility/ambulation  - Relieve pressure over bony prominences  - Avoid friction and shearing  - Provide appropriate hygiene as needed including keeping skin clean and dry  - Evaluate need for skin moisturizer/barrier cream  - Collaborate with interdisciplinary team   - Patient/family teaching  - Consider wound care consult   Outcome: Progressing     Problem: Potential for Falls  Goal: Patient will remain free of falls  Description  INTERVENTIONS:  - Assess patient frequently for physical needs  -  Identify cognitive and physical deficits and behaviors that affect risk of falls    -  [place] fall precautions as indicated by assessment   - Educate patient/family on patient safety including physical limitations  - Instruct patient to call for assistance with activity based on assessment  - Modify environment to reduce risk of injury  - Consider OT/PT consult to assist with strengthening/mobility  Outcome: Progressing     Problem: PAIN - ADULT  Goal: Verbalizes/displays adequate comfort level or baseline comfort level  Description  Interventions:  - Encourage patient to monitor pain and request assistance  - Assess pain using appropriate pain scale  - Administer analgesics based on type and severity of pain and evaluate response  - Implement non-pharmacological measures as appropriate and evaluate response  - Consider cultural and social influences on pain and pain management  - Notify physician/advanced practitioner if interventions unsuccessful or patient reports new pain  Outcome: Progressing     Problem: INFECTION - ADULT  Goal: Absence or prevention of progression during hospitalization  Description  INTERVENTIONS:  - Assess and monitor for signs and symptoms of infection  - Monitor lab/diagnostic results  - Monitor all insertion sites, i e  indwelling lines, tubes, and drains  - Monitor endotracheal if appropriate and nasal secretions for changes in amount and color  - [place] appropriate cooling/warming therapies per order  - Administer medications as ordered  - Instruct and encourage patient and family to use good hand hygiene technique  - Identify and instruct in appropriate isolation precautions for identified infection/condition  Outcome: Progressing  Goal: Absence of fever/infection during neutropenic period  Description  INTERVENTIONS:  - Monitor WBC    Outcome: Progressing     Problem: SAFETY ADULT  Goal: Maintain or return to baseline ADL function  Description  INTERVENTIONS:  -  Assess patient's ability to carry out ADLs; assess patient's baseline for ADL function and identify physical deficits which impact ability to perform ADLs (bathing, care of mouth/teeth, toileting, grooming, dressing, etc )  - Assess/evaluate cause of self-care deficits   - Assess range of motion  - Assess patient's mobility; develop plan if impaired  - Assess patient's need for assistive devices and provide as appropriate  - Encourage maximum independence but intervene and supervise when necessary  - Involve family in performance of ADLs  - Assess for home care needs following discharge   - Consider OT consult to assist with ADL evaluation and planning for discharge  - Provide patient education as appropriate  Outcome: Progressing  Goal: Maintain or return mobility status to optimal level  Description  INTERVENTIONS:  - Assess patient's baseline mobility status (ambulation, transfers, stairs, etc )    - Identify cognitive and physical deficits and behaviors that affect mobility  - Identify mobility aids required to assist with transfers and/or ambulation (gait belt, sit-to-stand, lift, walker, cane, etc )  - [place] fall precautions as indicated by assessment  - Record patient progress and toleration of activity level on Mobility SBAR; progress patient to next Phase/Stage  - Instruct patient to call for assistance with activity based on assessment  - Consider rehabilitation consult to assist with strengthening/weightbearing, etc   Outcome: Progressing     Problem: DISCHARGE PLANNING  Goal: Discharge to home or other facility with appropriate resources  Description  INTERVENTIONS:  - Identify barriers to discharge w/patient and caregiver  - Arrange for needed discharge resources and transportation as appropriate  - Identify discharge learning needs (meds, wound care, etc )  - Arrange for interpretive services to assist at discharge as needed  - Refer to Case Management Department for coordinating discharge planning if the patient needs post-hospital services based on physician/advanced practitioner order or complex needs related to functional status, cognitive ability, or social support system  Outcome: Progressing     Problem: Knowledge Deficit  Goal: Patient/family/caregiver demonstrates understanding of disease process, treatment plan, medications, and discharge instructions  Description  Complete learning assessment and assess knowledge base  Interventions:  - Provide teaching at level of understanding  - Provide teaching via preferred learning methods  Outcome: Progressing     Problem: Neurological Deficit  Goal: Neurological status is stable or improving  Description  Interventions:  - Monitor and assess patient's level of consciousness, motor function, sensory function, and level of assistance needed for ADLs  - Monitor and report changes from baseline  Collaborate with interdisciplinary team to initiate plan and implement interventions as ordered  - Provide and maintain a safe environment  - Consider seizure precautions  - Consider fall precautions    - Consider aspiration precautions  - Consider bleeding precautions  Outcome: Progressing     Problem: Activity Intolerance/Impaired Mobility  Goal: Mobility/activity is maintained at optimum level for patient  Description  Interventions:  - Assess and monitor patient  barriers to mobility and need for assistive/adaptive devices  - Assess patient's emotional response to limitations  - Collaborate with interdisciplinary team and initiate plans and interventions as ordered  - Encourage independent activity per ability   - Maintain proper body alignment  - Perform active/passive rom as tolerated/ordered  - Plan activities to conserve energy   - Turn patient as appropriate  Outcome: Progressing     Problem: Communication Impairment  Goal: Ability to express needs and understand communication  Description  Assess patient's communication skills and ability to understand information  Patient will demonstrate use of effective communication techniques, alternative methods of communication and understanding even if not able to speak  - Encourage communication and provide alternate methods of communication as needed  - Collaborate with case management/ for discharge needs  - Include patient/family/caregiver in decisions related to communication  Outcome: Progressing     Problem: Potential for Aspiration  Goal: Ventilated patient's risk of aspiration is minimized  Description  Assess and monitor vital signs, respiratory status, airway cuff pressure, and labs (WBC)  Monitor for signs of aspiration (tachypnea, cough, rales, wheezing, cyanosis, fever)  - Elevate head of bed 30 degrees if patient has tube feeding   - Monitor tube feeding    Outcome: Progressing     Problem: Nutrition  Goal: Nutrition/Hydration status is improving  Description  Monitor and assess patient's nutrition/hydration status for malnutrition (ex- brittle hair, bruises, dry skin, pale skin and conjunctiva, muscle wasting, smooth red tongue, and disorientation)  Collaborate with interdisciplinary team and initiate plan and interventions as ordered  Monitor patient's weight and dietary intake as ordered or per policy  Utilize nutrition screening tool and intervene per policy  Determine patient's food preferences and provide high-protein, high-caloric foods as appropriate  - Assist patient with eating   - Allow adequate time for meals   - Encourage patient to take dietary supplement as ordered  - Collaborate with clinical nutritionist   - Include patient/family/caregiver in decisions related to nutrition  Outcome: Progressing     Problem: NEUROSENSORY - ADULT  Goal: Achieves stable or improved neurological status  Description  INTERVENTIONS  - Monitor and report changes in neurological status  - Monitor vital signs such as temperature, blood pressure, glucose, and any other labs ordered   - Initiate measures to prevent increased intracranial pressure  - Monitor for seizure activity and implement precautions if appropriate      Outcome: Progressing  Goal: Achieves maximal functionality and self care  Description  INTERVENTIONS  - Monitor swallowing and airway patency with patient fatigue and changes in neurological status  - Encourage and assist patient to increase activity and self care     - Encourage visually impaired, hearing impaired and aphasic patients to use assistive/communication devices  Outcome: Progressing     Problem: CARDIOVASCULAR - ADULT  Goal: Maintains optimal cardiac output and hemodynamic stability  Description  INTERVENTIONS:  - Monitor I/O, vital signs and rhythm  - Monitor for S/S and trends of decreased cardiac output  - Administer and titrate ordered vasoactive medications to optimize hemodynamic stability  - Assess quality of pulses, skin color and temperature  - Assess for signs of decreased coronary artery perfusion  - Instruct patient to report change in severity of symptoms  Outcome: Progressing  Goal: Absence of cardiac dysrhythmias or at baseline rhythm  Description  INTERVENTIONS:  - Continuous cardiac monitoring, vital signs, obtain 12 lead EKG if ordered  - Administer antiarrhythmic and heart rate control medications as ordered  - Monitor electrolytes and administer replacement therapy as ordered  Outcome: Progressing     Problem: RESPIRATORY - ADULT  Goal: Achieves optimal ventilation and oxygenation  Description  INTERVENTIONS:  - Assess for changes in respiratory status  - Assess for changes in mentation and behavior  - Position to facilitate oxygenation and minimize respiratory effort  - Oxygen administered by appropriate delivery if ordered  - Initiate smoking cessation education as indicated  - Encourage broncho-pulmonary hygiene including cough, deep breathe, Incentive Spirometry  - Assess the need for suctioning and aspirate as needed  - Assess and instruct to report SOB or any respiratory difficulty  - Respiratory Therapy support as indicated  Outcome: Progressing     Problem: GASTROINTESTINAL - ADULT  Goal: Minimal or absence of nausea and/or vomiting  Description  INTERVENTIONS:  - Administer IV fluids if ordered to ensure adequate hydration  - Maintain NPO status until nausea and vomiting are resolved  - Nasogastric tube if ordered  - Administer ordered antiemetic medications as needed  - Provide nonpharmacologic comfort measures as appropriate  - Advance diet as tolerated, if ordered  - Consider nutrition services referral to assist patient with adequate nutrition and appropriate food choices  Outcome: Progressing  Goal: Maintains adequate nutritional intake  Description  INTERVENTIONS:  - Monitor percentage of each meal consumed  - Identify factors contributing to decreased intake, treat as appropriate  - Assist with meals as needed  - Monitor I&O, weight, and lab values if indicated  - Obtain nutrition services referral as needed  Outcome: Progressing     Problem: GENITOURINARY - ADULT  Goal: Maintains or returns to baseline urinary function  Description  INTERVENTIONS:  - Assess urinary function  - Encourage oral fluids to ensure adequate hydration if ordered  - Administer IV fluids as ordered to ensure adequate hydration  - Administer ordered medications as needed  - Offer frequent toileting  - Follow urinary retention protocol if ordered  Outcome: Progressing  Goal: Urinary catheter remains patent  Description  INTERVENTIONS:  - Assess patency of urinary catheter  - If patient has a chronic arizmendi, consider changing catheter if non-functioning  - Follow guidelines for intermittent irrigation of non-functioning urinary catheter  Outcome: Progressing     Problem: METABOLIC, FLUID AND ELECTROLYTES - ADULT  Goal: Electrolytes maintained within normal limits  Description  INTERVENTIONS:  - Monitor labs and assess patient for signs and symptoms of electrolyte imbalances  - Administer electrolyte replacement as ordered  - Monitor response to electrolyte replacements, including repeat lab results as appropriate  - Instruct patient on fluid and nutrition as appropriate  Outcome: Progressing  Goal: Fluid balance maintained  Description  INTERVENTIONS:  - Monitor labs   - Monitor I/O and WT  - Instruct patient on fluid and nutrition as appropriate  - Assess for signs & symptoms of volume excess or deficit  Outcome: Progressing     Problem: SKIN/TISSUE INTEGRITY - ADULT  Goal: Skin integrity remains intact  Description  INTERVENTIONS  - Identify patients at risk for skin breakdown  - Assess and monitor skin integrity  - Assess and monitor nutrition and hydration status  - Monitor labs (i e  albumin)  - Assess for incontinence   - Turn and reposition patient  - Assist with mobility/ambulation  - Relieve pressure over bony prominences  - Avoid friction and shearing  - Provide appropriate hygiene as needed including keeping skin clean and dry  - Evaluate need for skin moisturizer/barrier cream  - Collaborate with interdisciplinary team (i e  Nutrition, Rehabilitation, etc )   - Patient/family teaching  Outcome: Progressing  Goal: Incision(s), wounds(s) or drain site(s) healing without S/S of infection  Description  INTERVENTIONS  - Assess and document risk factors for skin impairment   - Assess and document dressing, incision, wound bed, drain sites and surrounding tissue  - Consider nutrition services referral as needed  - Oral mucous membranes remain intact  - Provide patient/ family education  Outcome: Progressing  Goal: Oral mucous membranes remain intact  Description  INTERVENTIONS  - Assess oral mucosa and hygiene practices  - Implement preventative oral hygiene regimen  - Implement oral medicated treatments as ordered  - Initiate Nutrition services referral as needed  Outcome: Progressing     Problem: HEMATOLOGIC - ADULT  Goal: Maintains hematologic stability  Description  INTERVENTIONS  - Assess for signs and symptoms of bleeding or hemorrhage  - Monitor labs  - Administer supportive blood products/factors as ordered and appropriate  Outcome: Progressing     Problem: MUSCULOSKELETAL - ADULT  Goal: Maintain or return mobility to safest level of function  Description  INTERVENTIONS:  - Assess patient's ability to carry out ADLs; assess patient's baseline for ADL function and identify physical deficits which impact ability to perform ADLs (bathing, care of mouth/teeth, toileting, grooming, dressing, etc )  - Assess/evaluate cause of self-care deficits   - Assess range of motion  - Assess patient's mobility  - Assess patient's need for assistive devices and provide as appropriate  - Encourage maximum independence but intervene and supervise when necessary  - Involve family in performance of ADLs  - Assess for home care needs following discharge   - Consider OT consult to assist with ADL evaluation and planning for discharge  - Provide patient education as appropriate  Outcome: Progressing     Problem: COPING  Goal: Pt/Family able to verbalize concerns and demonstrate effective coping strategies  Description  INTERVENTIONS:  - Assist patient/family to identify coping skills, available support systems and cultural and spiritual values  - Provide emotional support, including active listening and acknowledgement of concerns of patient and caregivers  - Reduce environmental stimuli, as able  - Provide patient education  - Assess for spiritual pain/suffering and initiate spiritual care, including notification of Pastoral Care or lit based community as needed  - Assess effectiveness of coping strategies  Outcome: Progressing  Goal: Will report anxiety at manageable levels  Description  INTERVENTIONS:  - Administer medication as ordered  - Teach and encourage coping skills  - Provide emotional support  - Assess patient/family for anxiety and ability to cope  Outcome: Progressing     Problem: Nutrition/Hydration-ADULT  Goal: Nutrient/Hydration intake appropriate for improving, restoring or maintaining nutritional needs  Description  Monitor and assess patient's nutrition/hydration status for malnutrition  Collaborate with interdisciplinary team and initiate plan and interventions as ordered  Monitor patient's weight and dietary intake as ordered or per policy  Utilize nutrition screening tool and intervene as necessary  Determine patient's food preferences and provide high-protein, high-caloric foods as appropriate       INTERVENTIONS:  - Monitor oral intake, urinary output, labs, and treatment plans  - Assess nutrition and hydration status and recommend course of action  - Evaluate amount of meals eaten  - Assist patient with eating if necessary   - Allow adequate time for meals  - Recommend/ encourage appropriate diets, oral nutritional supplements, and vitamin/mineral supplements  - Order, calculate, and assess calorie counts as needed  - Recommend, monitor, and adjust tube feedings and TPN/PPN based on assessed needs  - Assess need for intravenous fluids  - Provide specific nutrition/hydration education as appropriate  - Include patient/family/caregiver in decisions related to nutrition  Outcome: Progressing     Problem: SAFETY,RESTRAINT: NV/NON-SELF DESTRUCTIVE BEHAVIOR  Goal: Remains free of harm/injury (restraint for non violent/non self-detsructive behavior)  Description  INTERVENTIONS:  - Instruct patient/family regarding restraint use   - Assess and monitor physiologic and psychological status   - Provide interventions and comfort measures to meet assessed patient needs   - Identify and implement measures to help patient regain control  - Assess readiness for release of restraint   Outcome: Progressing  Goal: Returns to optimal restraint-free functioning  Description  INTERVENTIONS:  - Assess the patient's behavior and symptoms that indicate continued need for restraint  - Identify and implement measures to help patient regain control  - Assess readiness for release of restraint   Outcome: Progressing

## 2019-12-02 NOTE — PROGRESS NOTES
Daily Progress Note - Critical Care   Linda Amor 80 y o  male MRN: [id number]  Unit/Bed#: ICU 12 Encounter: [id number]        ----------------------------------------------------------------------------------------  HPI/24hr events:  No acute overnight events   awaiting family arrival for further discussion of goals of care     family meeting held with Dr Lucho Julian, Dr Gertrudis Tadeo,  In immediate family yesterday  Reviewed course of care and rationale for management  Prognosis in light of evolving large  Right hemispheric infarct  Neurologic impact includes severe limitations likely requiring long-term nursing care and significant debilitation  Concurrent issue of likely aspiration and recurrent infections  Family was in agreement that if patient  Would not desire now come that did not include returning to his previous function and dependent status  Patient was made DNR and no escalation of care will be initiated  We are awaiting arrival from a son from the [facility] - whereby we will likely proceed with compassionate extubation   ---------------------------------------------------------------------------------------  SUBJECTIVE   patient intubated and sedated      ---------------------------------------------------------------------------------------  Assessment and Plan:    Neuro:   [facility]  80year-old man with coronary artery disease, carotid stenosis status post left CEA now with new AFib presenting with  Right MCA on 11/30 status post tPA and endovascular thrombectomy  o   CT scan with evolving - worsening right MCA infarct  o  continue hypertonic saline  o Patient's recovery is further complicated by underlying comorbidities including low EF and pneumonitis  - Family meeting held yesterday - no escalation of care - we are awaiting arrival of the son from the [facility] we will likely proceed with compassionate extubation        CV:      Hypotension : Continue norepinephrine infusion for goal of systolic blood pressure greater than 140    ischemic cardiomyopathy- careful fluid administration   Non MI troponin leak: Likely related stress in light of cardiomyopathy -downtrending        Pulm:     ARDS: Continue supportive care with mechanical ventilation  No escalation  Wean FiO2 as tolerated   aspiration pneumonia:  Continue antibiotics -no escalation      GI:   o  PPI prophylaxis ordered      :   ·  creatinine 1 54-adequate urine output      F/E/N:     no IV fluids      Heme/Onc:   o  hemoglobin 11 5      Endo:   o  avoid hypoglycemia  o  sliding scale insulin -goal blood sugar between  70 and 180      ID:   o  aspiration pneumonia: Continue Rocephin and Flagyl      MSK/Skin:   o  frequent turning and offloading of pressure points      Disposition: Continue critical care until arrival of son  Do not escalate care further  Code Status: Level 2 - DNAR: but accepts endotracheal intubation  ---------------------------------------------------------------------------------------  ICU CORE MEASURES    Prophylaxis   VTE Pharmacologic Prophylaxis: No escalation  VTE Mechanical Prophylaxis: sequential compression device  Stress Ulcer Prophylaxis: Pantoprazole IV     ABCDE Protocol (if indicated)  Plan to perform spontaneous awakening trial today? No (provide explanation): Will proceed with compassionate  Extubation upon arrival of son  Plan to perform spontaneous breathing trial today?  No (provide explanation):  Compassion extubation    Invasive Devices Review  Invasive Devices     Central Venous Catheter Line            CVC Central Lines 11/30/19 Triple 20cm 1 day          Peripheral Intravenous Line            Peripheral IV 11/30/19 Right Hand 2 days    Peripheral IV 11/30/19 Right Antecubital 1 day          Arterial Line            Arterial Line 11/30/19 Left Radial 2 days          Drain            NG/OG Tube Orogastric Right mouth 2 days    NG/OG/Enteral Tube Orogastric 18 Fr Right mouth 2 days Urethral Catheter Temperature probe 16 Fr  2 days          Airway            ETT  Cuffed; Hi-Lo 8 mm 2 days              Can any invasive devices be discontinued today? Yes  ---------------------------------------------------------------------------------------  OBJECTIVE    Physical Exam   Constitutional: He appears well-developed and well-nourished  No distress  He is sedated, intubated and restrained  HENT:   Head: Normocephalic and atraumatic  Neck: Normal range of motion  No JVD present  Cardiovascular: Normal rate  An irregularly irregular rhythm present  Frequent extrasystoles are present  Pulmonary/Chest: He is intubated  He has rhonchi  Abdominal: Soft  Bowel sounds are normal    Musculoskeletal: Normal range of motion  He exhibits no edema  Neurological: He is unresponsive  Skin: Skin is warm and dry  He is not diaphoretic  Vitals   Vitals:    19 0339 19 0400 19 0500 19 0600   BP:       BP Location:       Pulse:  88 86 86   Resp:  18 16 20   Temp:  100 4 °F (38 °C) 100 4 °F (38 °C) 100 4 °F (38 °C)   TempSrc:  Bladder     SpO2: 100% 100% 100% 100%   Weight:       Height:         Temp (24hrs), Av 7 °F (38 2 °C), Min:100 °F (37 8 °C), Max:101 5 °F (38 6 °C)  Current: Temperature: 100 4 °F (38 °C)    Invasive/non-invasive ventilation settings   Respiratory    Lab Data (Last 4 hours)    None         O2/Vent Data (Last 4 hours)       033           Vent Mode AC/VC       Resp Rate (BPM) (BPM) 20       Vt (mL) (mL) 500       FIO2 (%) (%) 70       PEEP (cmH2O) (cmH2O) 15       MV 11 5                   Height and Weights   Height: 5' 10" (177 8 cm)  IBW: 73 kg  Body mass index is 30 05 kg/m²    Weight (last 2 days)     Date/Time   Weight    19 0545   95 (209 44)    19 0600       Comment rows:    OBSERV: dr Quyen Howe present and reviewed films/lab results at 19 0600    19 0310   99 1 (218 48)    19 0005   106 (234 35) Intake and Output  I/O       11/30 0701 - 12/01 0700 12/01 0701 - 12/02 0700 12/02 0701 - 12/03 0700    P  O  0 0     I V  (mL/kg) 2346 8 (24 7) 1616 3 (17)     NG/ 0     IV Piggyback 1800 250     Total Intake(mL/kg) 4266 8 (44 9) 1866 3 (19 6)     Urine (mL/kg/hr) 815 (0 4) 1095 (0 5)     Emesis/NG output 250 50     Stool  0     Total Output 1065 1145     Net +3201 8 +721 3            Unmeasured Stool Occurrence  0 x           Nutrition       Diet Orders   (From admission, onward)             Start     Ordered    11/30/19 1256  Diet NPO  Diet effective now     Question Answer Comment   Diet Type NPO    RD to adjust diet per protocol?  No        11/30/19 1256                  Laboratory and Diagnostics:  Results from last 7 days   Lab Units 12/02/19  0425 12/01/19  0440 11/30/19  0843 11/30/19  0525 11/30/19  0016 11/30/19  0015   WBC Thousand/uL 14 57* 22 09*  --  20 71*  --  11 31*   HEMOGLOBIN g/dL 11 5* 12 6 14 5 15 9  --  14 3   I STAT HEMOGLOBIN g/dl  --   --   --   --  14 6 15 0   HEMATOCRIT % 35 4* 37 8 43 1 48 0  --  42 6   HEMATOCRIT, ISTAT %  --   --   --   --  43 44   PLATELETS Thousands/uL 160 232  --  297  --  252   NEUTROS PCT % 88* 85*  --  89*  --   --    MONOS PCT % 6 8  --  6  --   --      Results from last 7 days   Lab Units 12/01/19  1552 12/01/19  0440 11/30/19  0843 11/30/19  0525 11/30/19  0016 11/30/19  0015   SODIUM mmol/L 142 142 139 136  --  138   POTASSIUM mmol/L 4 6 4 6 4 0 5 0  --  4 5   CHLORIDE mmol/L 115* 113* 112* 106  --  105   CO2 mmol/L 21 19* 20* 22  --  27   CO2, I-STAT mmol/L  --   --   --   --  27 25   AGAP mmol/L  --   --   --   --   --  16*   ANION GAP mmol/L 6 10 7 8  --  6   BUN mg/dL 36* 38* 24 24  --  23   CREATININE mg/dL 1 54* 1 63* 1 27 1 30  --  1 40*   CALCIUM mg/dL 8 1* 7 8* 7 6* 8 2*  --  9 2   GLUCOSE RANDOM mg/dL 167* 171* 133 218*  --  107   ALT U/L  --  22  --   --   --   --    AST U/L  --  71*  --   --   --   --    ALK PHOS U/L  --  81  --   -- --   --    ALBUMIN g/dL  --  3 1*  --   --   --   --    TOTAL BILIRUBIN mg/dL  --  0 78  --   --   --   --      Results from last 7 days   Lab Units 12/02/19  0425 11/30/19  0525   MAGNESIUM mg/dL 2 6 2 1   PHOSPHORUS mg/dL 1 9* 4 3*      Results from last 7 days   Lab Units 11/30/19  0015   INR  1 02   PTT seconds 29      Results from last 7 days   Lab Units 12/01/19  0440 11/30/19  1442 11/30/19  1154 11/30/19  0849 11/30/19  0015   TROPONIN I ng/mL 9 50* 11 10* 6 84* 6 50* <0 02     Results from last 7 days   Lab Units 12/01/19  1135 12/01/19  0731 12/01/19  0440 12/01/19  0009 11/30/19  1950   LACTIC ACID mmol/L 3 0* 2 8* 3 4* 4 1* 4 2*     ABG:  Results from last 7 days   Lab Units 12/01/19  0441   PH ART  7 405   PCO2 ART mm Hg 26 1*   PO2 ART mm Hg 178 3*   HCO3 ART mmol/L 16 0*   BASE EXC ART mmol/L -7 0   ABG SOURCE  Line, Arterial     VBG:  Results from last 7 days   Lab Units 12/01/19  0441 11/30/19  1829   PH LETY   --   --  7 253*   PCO2 LETY mm Hg  --   --  41 1*   PO2 LETY mm Hg  --   --  37 3   HCO3 LETY mmol/L  --   --  17 7*   BASE EXC LETY mmol/L  --   --  -9 0   ABG SOURCE  Line, Arterial   < >  --     < > = values in this interval not displayed  Results from last 7 days   Lab Units 12/01/19  0440 11/30/19  1651   PROCALCITONIN ng/ml 1 91* 1 55*       Micro  Results from last 7 days   Lab Units 11/30/19  1828 11/30/19  1759   BLOOD CULTURE  No Growth at 24 hrs  No Growth at 24 hrs   --    GRAM STAIN RESULT   --  1+ Polys  No bacteria seen       EKG: afib rate 82 on tele - frequent pvc/pac  Imaging:   CT head without contrast   Final Result      Extensive right hemispheric subacute infarction with cytotoxic edema involving anterior, middle and posterior cerebral artery territories  Cytotoxic edema slightly more pronounced than the prior examination resulting in worsening sulcal effacement  Possible petechial hemorrhage within the right frontal lobe    No diana parenchymal hemorrhage identified  Workstation performed: [id number]         XR chest portable   Final Result      1  Slight decrease in extensive bilateral airspace consolidations  2   Stable bilateral pleural effusions  3   Increased confluent opacity in the right mid lung which could be related to edema or infiltrate  Workstation performed: [id number]         CT head wo contrast   Final Result      Early subacute infarct within a large portion of the right hemisphere involving anterior, middle and posterior cerebral artery territories without hemorrhagic transformation  Please note this study was performed 11/30/2019 at 2:18 PM but has just now been presented for evaluation  Workstation performed: [id number]         CT chest wo contrast   Final Result      1  Multifocal pneumonia   2  Small right and trace left pleural effusions      The study was marked in EPIC for immediate notification  Workstation performed: [id number]         XR chest portable   Final Result      ET tube tip 9 cm above cyrus  Suggest 3 to 4 cm advancement with placement redocumentation  NG tube remains in place  Cardiomegaly  Persistent slight decreased vascular congestion  Increasing bilateral atelectasis and or infiltrates and left pleural effusion  The study was marked in EPIC for significant notification  Workstation performed: [id number]         XR chest portable ICU   Final Result      NG tube in place  Persistent cardiomegaly  Persistent increased vascular congestion  Worsening with developing right upper and bibasilar atelectasis and/or infiltrates  Small left effusion  The study was marked in EPIC for significant notification  Workstation performed: [id number]         IR stroke alert   Final Result      XR stroke alert portable chest   Final Result      Cardiomegaly  Vascular congestion        The study was marked in EPIC for significant notification  Workstation performed: [id number]         CT stroke alert brain   Final Result      No acute intracranial abnormality  Findings were directly discussed with Dr Ramone Muse on 11/30/2019 1240 AM       Workstation performed: [id number]         CTA stroke alert (head/neck)   Final Result      There is occlusion of the right canalicular and intracranial portions of the ICA  Normal left ophthalmic artery origin  Normal left ICA terminus  Proximal right A1 segment is occluded  Normal left A1 segment  Normal anterior communicating artery  Right A2 segment is perfused by the anterior communicating artery  Right M1 segment is occluded  The right MCA reconstitutes just prior to the M1 trifurcation  Findings were directly discussed with Dr Ramone Muse on 11/30/2019 1240 AM                      Workstation performed: [id number]            I have personally reviewed pertinent reports     and I have personally reviewed pertinent films in PACS    Active Medications  Scheduled Meds:  Current Facility-Administered Medications:  acetaminophen 650 mg Oral Q6H PRN Sue Breaux MD    cefTRIAXone 1,000 mg Intravenous Q24H Fiona Lagos MD Last Rate: Stopped (12/01/19 1822)   chlorhexidine 15 mL Swish [employer] Spit Q12H [address] Sue Breaux MD    dexmedetomidine 0 1-0 7 mcg/kg/hr Intravenous Titrated Fiona Lagos MD Last Rate: 0 698 mcg/kg/hr (12/02/19 0517)   fentaNYL 100 mcg/hr Intravenous Continuous Jaime Boyer MD Last Rate: 100 mcg/hr (12/01/19 1732)   fentanyl citrate (PF) 50 mcg Intravenous Q2H PRN Denisa Boyer MD    hydrALAZINE 10 mg Intravenous Q6H PRN Sue Breaux MD    insulin lispro 1-5 Units Subcutaneous Q6H [address] Fiona Lagos MD    ipratropium 0 5 mg Nebulization Q6H Aileen Martinez MD    Labetalol HCl 10 mg Intravenous Q6H PRN Sue Breaux MD    levalbuterol 1 25 mg Nebulization Q6H Aileen Martinez MD    metroNIDAZOLE 500 mg Intravenous Oli Montana MD Last Rate: 500 mg (12/02/19 0254)   norepinephrine 1-30 mcg/min Intravenous Titrated Shilpa Chaudhry MD Last Rate: 6 mcg/min (12/01/19 2210)   pantoprazole 40 mg Intravenous Q24H [address] Shereen Leon MD    sodium chloride 30 mL/hr Intravenous Continuous Hank Sakaugustinaponanetahodelano, DO Last Rate: 30 mL/hr (12/02/19 9081)     Continuous Infusions:    dexmedetomidine 0 1-0 7 mcg/kg/hr Last Rate: 0 698 mcg/kg/hr (12/02/19 0517)   fentaNYL 100 mcg/hr Last Rate: 100 mcg/hr (12/01/19 2352)   norepinephrine 1-30 mcg/min Last Rate: 6 mcg/min (12/01/19 2210)   sodium chloride 30 mL/hr Last Rate: 30 mL/hr (12/02/19 0512)     PRN Meds:     acetaminophen 650 mg Q6H PRN   fentanyl citrate (PF) 50 mcg Q2H PRN   hydrALAZINE 10 mg Q6H PRN   Labetalol HCl 10 mg Q6H PRN       Allergies   Allergies   Allergen Reactions    Penicillins Rash     rash       Advance Directive and Living Will:      Power of :    POLST:        Counseling / Coordination of Care  Total time spent today 31 minutes  Greater than 50% of total time was spent with the patient and / or family counseling and / or coordination of care  A description of the counseling / coordination of care: d/w family  d/w nursing  Adrián Menjivar PA-C        Portions of the record may have been created with voice recognition software  Occasional wrong word or "sound a like" substitutions may have occurred due to the inherent limitations of voice recognition software    Read the chart carefully and recognize, using context, where substitutions have occurred

## 2019-12-02 NOTE — ASSESSMENT & PLAN NOTE
Postop day 2 right ICA/MCA thrombectomy, TICI 2A    Imaging reviewed personally and with attending, results are as follows:   CT head without contrast 12/01/2019:  Extensive right hemispheric subacute infarction with cytotoxic edema involving anterior, middle and posterior cerebral artery territories  Cytotoxic edema slightly more pronounced than the prior examination resulting in worsening sulcal effacement  Possible petechial hemorrhage within the right frontal lobe  No diana parenchymal hemorrhage identified  Plan:   Family meeting held, awaiting patient's son to arrive and will likely do compassionate extubation   Medical management and pain control per primary team   DVT ppx:  SCDs    Neurosurgery will follow as needed during hospitalization  No further intervention warranted  No outpatient follow-up  Please call with questions or concerns  Signed off

## 2019-12-02 NOTE — PROGRESS NOTES
Progress Note - Vish Muñoz 1933, 80 y o  male MRN: [id number]    Unit/Bed#: ICU 12 Encounter: [id number]    Primary Care Provider: Julia Sanders MD   Date and time admitted to hospital: 11/30/2019 12:03 AM        * Ischemic stroke [facility])  Assessment & Plan  Postop day 2 right ICA/MCA thrombectomy, TICI 2A    Imaging reviewed personally and with attending, results are as follows:   CT head without contrast 12/01/2019:  Extensive right hemispheric subacute infarction with cytotoxic edema involving anterior, middle and posterior cerebral artery territories  Cytotoxic edema slightly more pronounced than the prior examination resulting in worsening sulcal effacement  Possible petechial hemorrhage within the right frontal lobe  No diana parenchymal hemorrhage identified  Plan:   Family meeting held, awaiting patient's son to arrive and will likely do compassionate extubation   Medical management and pain control per primary team   DVT ppx:  SCDs    Neurosurgery will follow as needed during hospitalization  No further intervention warranted  No outpatient follow-up  Please call with questions or concerns  Signed off  Acute respiratory failure with hypoxia [facility])  Assessment & Plan  Currently intubated    Hypertension  Assessment & Plan  Medical management per primary team  Currently on p r n  Hydralazine, P r n  Labetalol, Levophed    Subjective/Objective   Chief Complaint:  Postop day 2 right IC/MCA thrombectomy    Subjective: Intubated and sedated  Pending compassionate extubation once son arrives  Objective: Intubated, sedated, NAD    I/O       11/30 0701 - 12/01 0700 12/01 0701 - 12/02 0700 12/02 0701 - 12/03 0700    P  O  0 0     I V  (mL/kg) 2346 8 (24 7) 1616 3 (17) 171 5 (1 8)    NG/ 0 30    IV Piggyback 1800 250     Total Intake(mL/kg) 4266 8 (44 9) 1866 3 (19 6) 201 5 (2 1)    Urine (mL/kg/hr) 815 (0 4) 1095 (0 5) 95 (0 4)    Emesis/NG output 250 50 150    Stool  0 Total Output 1065 1145 245    Net +3201 8 +721 3 -43 5           Unmeasured Stool Occurrence  0 x           Invasive Devices     Central Venous Catheter Line            CVC Central Lines 11/30/19 Triple 20cm 1 day          Peripheral Intravenous Line            Peripheral IV 11/30/19 Right Hand 2 days    Peripheral IV 11/30/19 Right Antecubital 1 day          Arterial Line            Arterial Line 11/30/19 Left Radial 2 days          Drain            NG/OG Tube Orogastric Right mouth 2 days    NG/OG/Enteral Tube Orogastric 18 Fr Right mouth 2 days    Urethral Catheter Temperature probe 16 Fr  2 days          Airway            ETT  Cuffed; Hi-Lo 8 mm 2 days                Physical Exam:  Vitals: Blood pressure 160/71, pulse 91, temperature 100 4 °F (38 °C), temperature source Bladder, resp  rate 18, height 5' 10" (1 778 m), weight 95 kg (209 lb 7 oz), SpO2 100 %  ,Body mass index is 30 05 kg/m²  Hemodynamic Monitoring: MAP: Arterial Line MAP (mmHg): 88 mmHg     General appearance:  Intubated and sedated, appears stated age, and no distress  Head: Normocephalic, without obvious abnormality, atraumatic  Eyes: pupils minimally reactive bilaterally, does not track or open eyes to voice / painful stimuli  Lungs: non labored breathing  Heart: regular heart rate  Neurologic:   Mental status: GCS 6T (E1, V1T, M4), intubated, sedated, does not open eyes to voice or painful stimuli, mild withdrawal in right upper extremity but otherwise no movement in any other extremity    Cranial nerves: grossly intact (Cranial nerves II-XII)  Motor:  Mild withdraw to painful stimuli right upper extremity  Reflexes: 1+ and symmetric  Coordination:  No cough reflex, +bilateral corneal reflex, equivocal Babinski bilaterally      Lab Results:  Results from last 7 days   Lab Units 12/02/19  0425 12/01/19  0440 11/30/19  0843 11/30/19  0525   WBC Thousand/uL 14 57* 22 09*  --  20 71*   HEMOGLOBIN g/dL 11 5* 12 6 14 5 15 9   HEMATOCRIT % 35 4* 37 8 43 1 48 0   PLATELETS Thousands/uL 160 232  --  297   NEUTROS PCT % 88* 85*  --  89*   MONOS PCT % 6 8  --  6     Results from last 7 days   Lab Units 12/01/19  1552 12/01/19  0440 11/30/19  0843  11/30/19  0016 11/30/19  0015   POTASSIUM mmol/L 4 6 4 6 4 0   < >  --  4 5   CHLORIDE mmol/L 115* 113* 112*   < >  --  105   CO2 mmol/L 21 19* 20*   < >  --  27   CO2, I-STAT mmol/L  --   --   --   --  27 25   BUN mg/dL 36* 38* 24   < >  --  23   CREATININE mg/dL 1 54* 1 63* 1 27   < >  --  1 40*   CALCIUM mg/dL 8 1* 7 8* 7 6*   < >  --  9 2   ALK PHOS U/L  --  81  --   --   --   --    ALT U/L  --  22  --   --   --   --    AST U/L  --  71*  --   --   --   --    GLUCOSE, ISTAT mg/dl  --   --   --   --  109 109    < > = values in this interval not displayed  Results from last 7 days   Lab Units 12/02/19  0425 11/30/19  0525   MAGNESIUM mg/dL 2 6 2 1     Results from last 7 days   Lab Units 12/02/19  0425 11/30/19  0525   PHOSPHORUS mg/dL 1 9* 4 3*     Results from last 7 days   Lab Units 11/30/19  0015   INR  1 02   PTT seconds 29     No results found for: TROPONINT  ABG:  Lab Results   Component Value Date    PHART 7 405 12/01/2019    WUM3ADC 26 1 (LL) 12/01/2019    PO2ART 178 3 (H) 12/01/2019    LYS7EEW 16 0 (L) 12/01/2019    BEART -7 0 12/01/2019    SOURCE Line, Arterial 12/01/2019       Imaging Studies: I have personally reviewed pertinent reports  and I have personally reviewed pertinent films in PACS    Xr Chest Portable    Result Date: 12/1/2019  Impression: 1  Slight decrease in extensive bilateral airspace consolidations  2   Stable bilateral pleural effusions  3   Increased confluent opacity in the right mid lung which could be related to edema or infiltrate  Workstation performed: [id number]     Xr Chest Portable    Result Date: 11/30/2019  Impression: ET tube tip 9 cm above cyrus  Suggest 3 to 4 cm advancement with placement redocumentation  NG tube remains in place  Cardiomegaly   Persistent slight decreased vascular congestion  Increasing bilateral atelectasis and or infiltrates and left pleural effusion  The study was marked in EPIC for significant notification  Workstation performed: [id number]     Ct Head Without Contrast    Result Date: 12/1/2019  Impression: Extensive right hemispheric subacute infarction with cytotoxic edema involving anterior, middle and posterior cerebral artery territories  Cytotoxic edema slightly more pronounced than the prior examination resulting in worsening sulcal effacement  Possible petechial hemorrhage within the right frontal lobe  No diana parenchymal hemorrhage identified  Workstation performed: [id number]     Ct Head Wo Contrast    Result Date: 12/1/2019  Impression: Early subacute infarct within a large portion of the right hemisphere involving anterior, middle and posterior cerebral artery territories without hemorrhagic transformation  Please note this study was performed 11/30/2019 at 2:18 PM but has just now been presented for evaluation  Workstation performed: [id number]     Ct Chest Wo Contrast    Result Date: 11/30/2019  Impression: 1  Multifocal pneumonia 2  Small right and trace left pleural effusions The study was marked in EPIC for immediate notification  Workstation performed: [id number]     Xr Stroke Alert Portable Chest    Result Date: 11/30/2019  Impression: Cardiomegaly  Vascular congestion  The study was marked in EPIC for significant notification  Workstation performed: [id number]     Ct Stroke Alert Brain    Result Date: 11/30/2019  Impression: No acute intracranial abnormality  Findings were directly discussed with Dr Huma Michael on 11/30/2019 1240 AM  Workstation performed: [id number]     Xr Chest Portable Icu    Result Date: 11/30/2019  Impression: NG tube in place  Persistent cardiomegaly  Persistent increased vascular congestion  Worsening with developing right upper and bibasilar atelectasis and/or infiltrates  Small left effusion   The study was marked in EPIC for significant notification  Workstation performed: [id number]     Cta Stroke Alert (head/neck)    Result Date: 11/30/2019  Impression: There is occlusion of the right canalicular and intracranial portions of the ICA  Normal left ophthalmic artery origin  Normal left ICA terminus  Proximal right A1 segment is occluded  Normal left A1 segment  Normal anterior communicating artery  Right A2 segment is perfused by the anterior communicating artery  Right M1 segment is occluded  The right MCA reconstitutes just prior to the M1 trifurcation  Findings were directly discussed with Dr Mook Mendez on 11/30/2019 1240 AM  Workstation performed: [id number]     EKG, Pathology, and Other Studies: I have personally reviewed pertinent reports        VTE Pharmacologic Prophylaxis: none ordered    VTE Mechanical Prophylaxis: sequential compression device

## 2019-12-03 LAB
BACTERIA BRONCH AEROBE CULT: ABNORMAL
GRAM STN SPEC: ABNORMAL
GRAM STN SPEC: ABNORMAL

## 2019-12-03 NOTE — PROGRESS NOTES
The patient's family had spoken with the Critical Care Medicine Team earlier today, and they decided to extubate the patient and make him "Comfort Care"  The patient was extubated @ ~ 17:15, and medications were administered to help keep him comfortable  The patient , with the family present @ the bedside

## 2019-12-03 NOTE — PLAN OF CARE
Problem: Prexisting or High Potential for Compromised Skin Integrity  Goal: Skin integrity is maintained or improved  Description  INTERVENTIONS:  - Identify patients at risk for skin breakdown  - Assess and monitor skin integrity  - Assess and monitor nutrition and hydration status  - Monitor labs   - Assess for incontinence   - Turn and reposition patient  - Assist with mobility/ambulation  - Relieve pressure over bony prominences  - Avoid friction and shearing  - Provide appropriate hygiene as needed including keeping skin clean and dry  - Evaluate need for skin moisturizer/barrier cream  - Collaborate with interdisciplinary team   - Patient/family teaching  - Consider wound care consult   12/2/2019 1934 by Yan Wilder RN  Outcome: Completed  12/2/2019 1158 by Yan Wilder RN  Outcome: Progressing     Problem: Potential for Falls  Goal: Patient will remain free of falls  Description  INTERVENTIONS:  - Assess patient frequently for physical needs  -  Identify cognitive and physical deficits and behaviors that affect risk of falls    -  [place] fall precautions as indicated by assessment   - Educate patient/family on patient safety including physical limitations  - Instruct patient to call for assistance with activity based on assessment  - Modify environment to reduce risk of injury  - Consider OT/PT consult to assist with strengthening/mobility  12/2/2019 1934 by Yan Wilder RN  Outcome: Completed  12/2/2019 1158 by Yan Wilder RN  Outcome: Progressing     Problem: PAIN - ADULT  Goal: Verbalizes/displays adequate comfort level or baseline comfort level  Description  Interventions:  - Encourage patient to monitor pain and request assistance  - Assess pain using appropriate pain scale  - Administer analgesics based on type and severity of pain and evaluate response  - Implement non-pharmacological measures as appropriate and evaluate response  - Consider cultural and social influences on pain and pain management  - Notify physician/advanced practitioner if interventions unsuccessful or patient reports new pain  12/2/2019 1934 by Afshan Laguerre RN  Outcome: Completed  12/2/2019 1158 by Afshan Laguerre RN  Outcome: Progressing     Problem: INFECTION - ADULT  Goal: Absence or prevention of progression during hospitalization  Description  INTERVENTIONS:  - Assess and monitor for signs and symptoms of infection  - Monitor lab/diagnostic results  - Monitor all insertion sites, i e  indwelling lines, tubes, and drains  - Monitor endotracheal if appropriate and nasal secretions for changes in amount and color  - [place] appropriate cooling/warming therapies per order  - Administer medications as ordered  - Instruct and encourage patient and family to use good hand hygiene technique  - Identify and instruct in appropriate isolation precautions for identified infection/condition  12/2/2019 1934 by Afshan Laguerre RN  Outcome: Completed  12/2/2019 1158 by Afshan Laguerre RN  Outcome: Progressing  Goal: Absence of fever/infection during neutropenic period  Description  INTERVENTIONS:  - Monitor WBC    12/2/2019 1934 by Afshan Laguerre RN  Outcome: Completed  12/2/2019 1158 by Afshan Laguerre RN  Outcome: Progressing     Problem: SAFETY ADULT  Goal: Maintain or return to baseline ADL function  Description  INTERVENTIONS:  -  Assess patient's ability to carry out ADLs; assess patient's baseline for ADL function and identify physical deficits which impact ability to perform ADLs (bathing, care of mouth/teeth, toileting, grooming, dressing, etc )  - Assess/evaluate cause of self-care deficits   - Assess range of motion  - Assess patient's mobility; develop plan if impaired  - Assess patient's need for assistive devices and provide as appropriate  - Encourage maximum independence but intervene and supervise when necessary  - Involve family in performance of ADLs  - Assess for home care needs following discharge   - Consider OT consult to assist with ADL evaluation and planning for discharge  - Provide patient education as appropriate  12/2/2019 1934 by Suraj Hedrick RN  Outcome: Completed  12/2/2019 1158 by Suraj Hedrick RN  Outcome: Progressing  Goal: Maintain or return mobility status to optimal level  Description  INTERVENTIONS:  - Assess patient's baseline mobility status (ambulation, transfers, stairs, etc )    - Identify cognitive and physical deficits and behaviors that affect mobility  - Identify mobility aids required to assist with transfers and/or ambulation (gait belt, sit-to-stand, lift, walker, cane, etc )  - [place] fall precautions as indicated by assessment  - Record patient progress and toleration of activity level on Mobility SBAR; progress patient to next Phase/Stage  - Instruct patient to call for assistance with activity based on assessment  - Consider rehabilitation consult to assist with strengthening/weightbearing, etc   12/2/2019 1934 by Suraj Hedrick RN  Outcome: Completed  12/2/2019 1158 by Suraj Hedrick RN  Outcome: Progressing     Problem: DISCHARGE PLANNING  Goal: Discharge to home or other facility with appropriate resources  Description  INTERVENTIONS:  - Identify barriers to discharge w/patient and caregiver  - Arrange for needed discharge resources and transportation as appropriate  - Identify discharge learning needs (meds, wound care, etc )  - Arrange for interpretive services to assist at discharge as needed  - Refer to Case Management Department for coordinating discharge planning if the patient needs post-hospital services based on physician/advanced practitioner order or complex needs related to functional status, cognitive ability, or social support system  12/2/2019 1934 by Suraj Hedrick RN  Outcome: Completed  12/2/2019 1158 by Suraj Hedrick RN  Outcome: Progressing     Problem: Knowledge Deficit  Goal: Patient/family/caregiver demonstrates understanding of disease process, treatment plan, medications, and discharge instructions  Description  Complete learning assessment and assess knowledge base  Interventions:  - Provide teaching at level of understanding  - Provide teaching via preferred learning methods  12/2/2019 1934 by Shilpa Choi RN  Outcome: Completed  12/2/2019 1158 by Shilpa Choi RN  Outcome: Progressing     Problem: Neurological Deficit  Goal: Neurological status is stable or improving  Description  Interventions:  - Monitor and assess patient's level of consciousness, motor function, sensory function, and level of assistance needed for ADLs  - Monitor and report changes from baseline  Collaborate with interdisciplinary team to initiate plan and implement interventions as ordered  - Provide and maintain a safe environment  - Consider seizure precautions  - Consider fall precautions  - Consider aspiration precautions  - Consider bleeding precautions  12/2/2019 1934 by Shilpa Choi RN  Outcome: Completed  12/2/2019 1158 by Shilpa Choi RN  Outcome: Progressing     Problem: Activity Intolerance/Impaired Mobility  Goal: Mobility/activity is maintained at optimum level for patient  Description  Interventions:  - Assess and monitor patient  barriers to mobility and need for assistive/adaptive devices  - Assess patient's emotional response to limitations  - Collaborate with interdisciplinary team and initiate plans and interventions as ordered  - Encourage independent activity per ability   - Maintain proper body alignment  - Perform active/passive rom as tolerated/ordered  - Plan activities to conserve energy   - Turn patient as appropriate  12/2/2019 1934 by Shilpa Choi RN  Outcome: Completed  12/2/2019 1158 by Shilpa Choi RN  Outcome: Progressing     Problem: Communication Impairment  Goal: Ability to express needs and understand communication  Description  Assess patient's communication skills and ability to understand information    Patient will demonstrate use of effective communication techniques, alternative methods of communication and understanding even if not able to speak  - Encourage communication and provide alternate methods of communication as needed  - Collaborate with case management/ for discharge needs  - Include patient/family/caregiver in decisions related to communication  12/2/2019 1934 by Grecia Aquino RN  Outcome: Completed  12/2/2019 1158 by Grecia Aquino RN  Outcome: Progressing     Problem: Potential for Aspiration  Goal: Ventilated patient's risk of aspiration is minimized  Description  Assess and monitor vital signs, respiratory status, airway cuff pressure, and labs (WBC)  Monitor for signs of aspiration (tachypnea, cough, rales, wheezing, cyanosis, fever)  - Elevate head of bed 30 degrees if patient has tube feeding   - Monitor tube feeding  12/2/2019 1934 by Grecia Aquino RN  Outcome: Completed  12/2/2019 1158 by Grecia Aquino RN  Outcome: Progressing     Problem: Nutrition  Goal: Nutrition/Hydration status is improving  Description  Monitor and assess patient's nutrition/hydration status for malnutrition (ex- brittle hair, bruises, dry skin, pale skin and conjunctiva, muscle wasting, smooth red tongue, and disorientation)  Collaborate with interdisciplinary team and initiate plan and interventions as ordered  Monitor patient's weight and dietary intake as ordered or per policy  Utilize nutrition screening tool and intervene per policy  Determine patient's food preferences and provide high-protein, high-caloric foods as appropriate  - Assist patient with eating   - Allow adequate time for meals   - Encourage patient to take dietary supplement as ordered  - Collaborate with clinical nutritionist   - Include patient/family/caregiver in decisions related to nutrition    12/2/2019 1934 by Grecia Aquino RN  Outcome: Completed  12/2/2019 1158 by Grecia Aquino RN  Outcome: Progressing     Problem: NEUROSENSORY - ADULT  Goal: Achieves stable or improved neurological status  Description  INTERVENTIONS  - Monitor and report changes in neurological status  - Monitor vital signs such as temperature, blood pressure, glucose, and any other labs ordered   - Initiate measures to prevent increased intracranial pressure  - Monitor for seizure activity and implement precautions if appropriate      12/2/2019 1934 by Luis Felipe Pablo RN  Outcome: Completed  12/2/2019 1158 by Luis Felipe Pablo RN  Outcome: Progressing  Goal: Achieves maximal functionality and self care  Description  INTERVENTIONS  - Monitor swallowing and airway patency with patient fatigue and changes in neurological status  - Encourage and assist patient to increase activity and self care     - Encourage visually impaired, hearing impaired and aphasic patients to use assistive/communication devices  12/2/2019 1934 by Luis Felipe Pablo RN  Outcome: Completed  12/2/2019 1158 by Luis Felipe Pablo RN  Outcome: Progressing     Problem: CARDIOVASCULAR - ADULT  Goal: Maintains optimal cardiac output and hemodynamic stability  Description  INTERVENTIONS:  - Monitor I/O, vital signs and rhythm  - Monitor for S/S and trends of decreased cardiac output  - Administer and titrate ordered vasoactive medications to optimize hemodynamic stability  - Assess quality of pulses, skin color and temperature  - Assess for signs of decreased coronary artery perfusion  - Instruct patient to report change in severity of symptoms  12/2/2019 1934 by Luis Felipe Pablo RN  Outcome: Completed  12/2/2019 1158 by Luis Felipe Pablo RN  Outcome: Progressing  Goal: Absence of cardiac dysrhythmias or at baseline rhythm  Description  INTERVENTIONS:  - Continuous cardiac monitoring, vital signs, obtain 12 lead EKG if ordered  - Administer antiarrhythmic and heart rate control medications as ordered  - Monitor electrolytes and administer replacement therapy as ordered  12/2/2019 1934 by Luis Felipe Pablo RN  Outcome: Completed  12/2/2019 1158 by Suraj Hedrick RN  Outcome: Progressing     Problem: RESPIRATORY - ADULT  Goal: Achieves optimal ventilation and oxygenation  Description  INTERVENTIONS:  - Assess for changes in respiratory status  - Assess for changes in mentation and behavior  - Position to facilitate oxygenation and minimize respiratory effort  - Oxygen administered by appropriate delivery if ordered  - Initiate smoking cessation education as indicated  - Encourage broncho-pulmonary hygiene including cough, deep breathe, Incentive Spirometry  - Assess the need for suctioning and aspirate as needed  - Assess and instruct to report SOB or any respiratory difficulty  - Respiratory Therapy support as indicated  12/2/2019 1934 by Suraj Hedrick RN  Outcome: Completed  12/2/2019 1158 by Suraj Hedrick RN  Outcome: Progressing     Problem: GASTROINTESTINAL - ADULT  Goal: Minimal or absence of nausea and/or vomiting  Description  INTERVENTIONS:  - Administer IV fluids if ordered to ensure adequate hydration  - Maintain NPO status until nausea and vomiting are resolved  - Nasogastric tube if ordered  - Administer ordered antiemetic medications as needed  - Provide nonpharmacologic comfort measures as appropriate  - Advance diet as tolerated, if ordered  - Consider nutrition services referral to assist patient with adequate nutrition and appropriate food choices  12/2/2019 1934 by Suraj Hedrick RN  Outcome: Completed  12/2/2019 1158 by Suraj Hedrick RN  Outcome: Progressing  Goal: Maintains adequate nutritional intake  Description  INTERVENTIONS:  - Monitor percentage of each meal consumed  - Identify factors contributing to decreased intake, treat as appropriate  - Assist with meals as needed  - Monitor I&O, weight, and lab values if indicated  - Obtain nutrition services referral as needed  12/2/2019 1934 by Suraj Hedrick RN  Outcome: Completed  12/2/2019 1158 by Suraj Hedrick RN  Outcome: Progressing Problem: GENITOURINARY - ADULT  Goal: Maintains or returns to baseline urinary function  Description  INTERVENTIONS:  - Assess urinary function  - Encourage oral fluids to ensure adequate hydration if ordered  - Administer IV fluids as ordered to ensure adequate hydration  - Administer ordered medications as needed  - Offer frequent toileting  - Follow urinary retention protocol if ordered  12/2/2019 1934 by Ayanna Benito RN  Outcome: Completed  12/2/2019 1158 by Ayanna Benito RN  Outcome: Progressing  Goal: Urinary catheter remains patent  Description  INTERVENTIONS:  - Assess patency of urinary catheter  - If patient has a chronic arizmendi, consider changing catheter if non-functioning  - Follow guidelines for intermittent irrigation of non-functioning urinary catheter  12/2/2019 1934 by Ayanna Benito RN  Outcome: Completed  12/2/2019 1158 by Ayanna Benito RN  Outcome: Progressing     Problem: METABOLIC, FLUID AND ELECTROLYTES - ADULT  Goal: Electrolytes maintained within normal limits  Description  INTERVENTIONS:  - Monitor labs and assess patient for signs and symptoms of electrolyte imbalances  - Administer electrolyte replacement as ordered  - Monitor response to electrolyte replacements, including repeat lab results as appropriate  - Instruct patient on fluid and nutrition as appropriate  12/2/2019 1934 by Ayanna Benito RN  Outcome: Completed  12/2/2019 1158 by Ayanna Benito RN  Outcome: Progressing  Goal: Fluid balance maintained  Description  INTERVENTIONS:  - Monitor labs   - Monitor I/O and WT  - Instruct patient on fluid and nutrition as appropriate  - Assess for signs & symptoms of volume excess or deficit  12/2/2019 1934 by Ayanna Benito RN  Outcome: Completed  12/2/2019 1158 by Ayanna Benito RN  Outcome: Progressing     Problem: SKIN/TISSUE INTEGRITY - ADULT  Goal: Skin integrity remains intact  Description  INTERVENTIONS  - Identify patients at risk for skin breakdown  - Assess and monitor skin integrity  - Assess and monitor nutrition and hydration status  - Monitor labs (i e  albumin)  - Assess for incontinence   - Turn and reposition patient  - Assist with mobility/ambulation  - Relieve pressure over bony prominences  - Avoid friction and shearing  - Provide appropriate hygiene as needed including keeping skin clean and dry  - Evaluate need for skin moisturizer/barrier cream  - Collaborate with interdisciplinary team (i e  Nutrition, Rehabilitation, etc )   - Patient/family teaching  12/2/2019 1934 by Bing Lemon RN  Outcome: Completed  12/2/2019 1158 by Bing Lemon RN  Outcome: Progressing  Goal: Incision(s), wounds(s) or drain site(s) healing without S/S of infection  Description  INTERVENTIONS  - Assess and document risk factors for skin impairment   - Assess and document dressing, incision, wound bed, drain sites and surrounding tissue  - Consider nutrition services referral as needed  - Oral mucous membranes remain intact  - Provide patient/ family education  12/2/2019 1934 by Bing Lemon RN  Outcome: Completed  12/2/2019 1158 by Bing Lemon RN  Outcome: Progressing  Goal: Oral mucous membranes remain intact  Description  INTERVENTIONS  - Assess oral mucosa and hygiene practices  - Implement preventative oral hygiene regimen  - Implement oral medicated treatments as ordered  - Initiate Nutrition services referral as needed  12/2/2019 1934 by Bing Lemon RN  Outcome: Completed  12/2/2019 1158 by Bing Lemon RN  Outcome: Progressing     Problem: HEMATOLOGIC - ADULT  Goal: Maintains hematologic stability  Description  INTERVENTIONS  - Assess for signs and symptoms of bleeding or hemorrhage  - Monitor labs  - Administer supportive blood products/factors as ordered and appropriate  12/2/2019 1934 by Bing Lemon RN  Outcome: Completed  12/2/2019 1158 by Bing Lemon RN  Outcome: Progressing     Problem: MUSCULOSKELETAL - ADULT  Goal: Maintain or return mobility to safest level of function  Description  INTERVENTIONS:  - Assess patient's ability to carry out ADLs; assess patient's baseline for ADL function and identify physical deficits which impact ability to perform ADLs (bathing, care of mouth/teeth, toileting, grooming, dressing, etc )  - Assess/evaluate cause of self-care deficits   - Assess range of motion  - Assess patient's mobility  - Assess patient's need for assistive devices and provide as appropriate  - Encourage maximum independence but intervene and supervise when necessary  - Involve family in performance of ADLs  - Assess for home care needs following discharge   - Consider OT consult to assist with ADL evaluation and planning for discharge  - Provide patient education as appropriate  12/2/2019 1934 by Yevgeniy Frankel RN  Outcome: Completed  12/2/2019 1158 by Yevgeniy Frankel RN  Outcome: Progressing     Problem: COPING  Goal: Pt/Family able to verbalize concerns and demonstrate effective coping strategies  Description  INTERVENTIONS:  - Assist patient/family to identify coping skills, available support systems and cultural and spiritual values  - Provide emotional support, including active listening and acknowledgement of concerns of patient and caregivers  - Reduce environmental stimuli, as able  - Provide patient education  - Assess for spiritual pain/suffering and initiate spiritual care, including notification of Pastoral Care or lit based community as needed  - Assess effectiveness of coping strategies  12/2/2019 1934 by Yevgeniy Frankel RN  Outcome: Completed  12/2/2019 1158 by Yevgeniy Frankel RN  Outcome: Progressing  Goal: Will report anxiety at manageable levels  Description  INTERVENTIONS:  - Administer medication as ordered  - Teach and encourage coping skills  - Provide emotional support  - Assess patient/family for anxiety and ability to cope  12/2/2019 1934 by Yevgeniy Frankel RN  Outcome: Completed  12/2/2019 1158 by Yevgeniy Frankel RN  Outcome: Progressing Problem: Nutrition/Hydration-ADULT  Goal: Nutrient/Hydration intake appropriate for improving, restoring or maintaining nutritional needs  Description  Monitor and assess patient's nutrition/hydration status for malnutrition  Collaborate with interdisciplinary team and initiate plan and interventions as ordered  Monitor patient's weight and dietary intake as ordered or per policy  Utilize nutrition screening tool and intervene as necessary  Determine patient's food preferences and provide high-protein, high-caloric foods as appropriate       INTERVENTIONS:  - Monitor oral intake, urinary output, labs, and treatment plans  - Assess nutrition and hydration status and recommend course of action  - Evaluate amount of meals eaten  - Assist patient with eating if necessary   - Allow adequate time for meals  - Recommend/ encourage appropriate diets, oral nutritional supplements, and vitamin/mineral supplements  - Order, calculate, and assess calorie counts as needed  - Recommend, monitor, and adjust tube feedings and TPN/PPN based on assessed needs  - Assess need for intravenous fluids  - Provide specific nutrition/hydration education as appropriate  - Include patient/family/caregiver in decisions related to nutrition  12/2/2019 1934 by Karley Padilla RN  Outcome: Completed  12/2/2019 1158 by Karley Padilla RN  Outcome: Progressing     Problem: SAFETY,RESTRAINT: NV/NON-SELF DESTRUCTIVE BEHAVIOR  Goal: Remains free of harm/injury (restraint for non violent/non self-detsructive behavior)  Description  INTERVENTIONS:  - Instruct patient/family regarding restraint use   - Assess and monitor physiologic and psychological status   - Provide interventions and comfort measures to meet assessed patient needs   - Identify and implement measures to help patient regain control  - Assess readiness for release of restraint   12/2/2019 1546 by Karley Padilla RN  Outcome: Completed  12/2/2019 1158 by Karley Padilla RN  Outcome: Progressing  Goal: Returns to optimal restraint-free functioning  Description  INTERVENTIONS:  - Assess the patient's behavior and symptoms that indicate continued need for restraint  - Identify and implement measures to help patient regain control  - Assess readiness for release of restraint   12/2/2019 1546 by Gaye Chapin, RN  Outcome: Completed  12/2/2019 1158 by Gaye Chapin, RN  Outcome: Progressing

## 2019-12-03 NOTE — CLINICAL RISK MANAGEMENT
Progress Note - Death in [address] [place] [address] 80 y o  male MRN: [id number]  Unit/Bed#: ICU 12 Encounter: [id number]      Patient  within 24 hours of restraint  with Soft restraint right wrist  Death unrelated to use of restraints  This situation was tracked internally  CMS and PA-SEGUNDO  notification not required

## 2019-12-03 NOTE — DISCHARGE SUMMARY
Discharge Summary - Leodan Martinez 80 y o  male MRN: [id number]    Unit/Bed#: ICU 12 Encounter: [id number] PCP: Augusto Medina MD    Admission Date:   Admission Orders (From admission, onward)     Ordered        19 0415  Inpatient Admission  Once                     Admitting Diagnosis: CVA (cerebral vascular accident) [facility]) [I63 [id number]]    HPI: Per Dr Skyler Hyatt on admission  "Leodan Martinez is a 80 y o  male with history of CABG, R carotid stenosis, HTN, AVR who presents to the ED with acute L sided weakness around 11:45 pm  Pt was a stroke alert in the ED  CTA showed a R ICA terminus occlusion  Initial NIH was 14  tPA was given  Pt then underwent R ICA arteriogram, R MCA thrombectomy, and R GI thrombectomy  Pt was intubated during this procedure  There was concern for an aspiration event  He then presented to the ICU post-op "    Procedures Performed:   Orders Placed This Encounter   Procedures   [address] Course: Patient arrived to ED with neurological deficits and stroke alert was called, on CTA patient is found to have occlusions  Patient was administered tPA and underwent thrombectomy  Patient remained intubated after thrombectomy procedure and was admitted to ICU  Next day, patient became less arousable, had course breath sounds bilaterally correlating to a pneumonitis or aspiration pneumonia, and was requiring vasopressors for cerebral perfusion  Repeat CTA shows evolving CVA with edema and mass effect with small amount of hemorrhage noted  Patient underwent echocardiogram which showed reduced cardiac function, thought to be stressed induced  Family meeting held with Critical Care and Neurology teams, family agreed to make patient DNR and wait for patient's son to arrive from out of state before moving to comfort measures  On , patient was made comfort care and  on  evening       Significant Findings, Care, Treatment and Services Provided: CTA : There is occlusion of the right canalicular and intracranial portions of the ICA  Normal left ophthalmic artery origin  Normal left ICA terminus        Proximal right A1 segment is occluded  Normal left A1 segment  Normal anterior communicating artery  Right A2 segment is perfused by the anterior communicating artery        Right M1 segment is occluded  The right MCA reconstitutes just prior to the M1 trifurcation  TPA given on  and patient underwent thrombectomy    CT Head :  Early subacute infarct within a large portion of the right hemisphere involving anterior, middle and posterior cerebral artery territories without hemorrhagic transformation  CT Chest :  1  Multifocal pneumonia  2  Small right and trace left pleural effusions    CT Head :  Extensive right hemispheric subacute infarction with cytotoxic edema involving anterior, middle and posterior cerebral artery territories  Cytotoxic edema slightly more pronounced than the prior examination resulting in worsening sulcal effacement  Possible petechial hemorrhage within the right frontal lobe  No diana parenchymal hemorrhage identified  Neurology, Neurosurgery, Cardiology were consulted for patient      Complications:   Aspiration pneumonia, worsening infarct territory    Disposition:      Final Diagnosis: Ischemic stroke    Resolved Problems  Date Reviewed: 2019    None          Condition at Time of Death: comfortable, family at bedside    Date, Time and Cause of Death    Date of Death:  19  Time of Death:   6:30 PM  Preliminary Cause of Death:  Cerebral ischemia

## 2019-12-03 NOTE — DEATH NOTE
INPATIENT DEATH NOTE  Gisselle Reagan 80 y o  male MRN: [id number]  Unit/Bed#: ICU 12 Encounter: [id number]         Patient's Information  Pronounced by: Dr Roney Arzate  Did the patient's death occur in the ED?: No  Did the patient's death occur in the OR?: No  Did the patient's death occur less than 10 days post-op?: No  Did the patient's death occur within 24 hours of admission?: No  Was code status DNR at the time of death?: Yes    PHYSICAL EXAM:  Unresponsive to noxious stimuli, Spontaneous respirations absent, Breath sounds absent, Carotid pulse absent, Heart sounds absent, Pupillary light reflex absent and Corneal blink reflex absent    Medical Examiner notification criteria:  NONE APPLICABLE   Medical Examiner's office notified?:  Yes   Medical Examiner accepted case?:  No      Family Notification  Was the family notified?: No (Comment)(Family was present )  Date Notified: 19  Time Notified:   Notified by: Family was present @ bedside  Family Notification Route:  At bedside  Was the family told to contact a  home?: Yes  Name of [facility][de-identified] [address], [place]    Autopsy Options:  Post-mortem examination declined by next of kin    Primary Service Attending Physician notified?:  yes - Attending:  Jonah Sanders, DO    Physician/Resident responsible for completing Discharge Summary:  Roney Arzate, DO

## 2019-12-06 LAB
BACTERIA BLD CULT: NORMAL
BACTERIA BLD CULT: NORMAL

## 2023-03-03 NOTE — ANESTHESIA POSTPROCEDURE EVALUATION
Post-Op Assessment Note    CV Status:  Stable       Hydration Status:  Stable   Airway Patency:  Patent  Airway: intubated    Staff: AnesthesiologistKELSEY           BP (!) 165/114 (11/30/19 0310)    Temp 98 °F (36 7 °C) (11/30/19 0310)    Pulse (!) 126 (11/30/19 0310)   Resp 16 (11/30/19 0310)    SpO2 96 % (11/30/19 0310) Russ Yap

## 2025-01-29 NOTE — STROKE DOCUMENTATION
Dr Caitie Moseley on phone with Dr Kamala Mulligan (marisol) how has ordered the start of TPA 
Family at beside  Family given emotional support 
room air